# Patient Record
Sex: FEMALE | Race: WHITE | Employment: UNEMPLOYED | URBAN - METROPOLITAN AREA
[De-identification: names, ages, dates, MRNs, and addresses within clinical notes are randomized per-mention and may not be internally consistent; named-entity substitution may affect disease eponyms.]

---

## 2017-01-16 ENCOUNTER — HOSPITAL ENCOUNTER (OUTPATIENT)
Dept: OTHER | Age: 72
Discharge: HOME | End: 2017-01-16
Attending: INTERNAL MEDICINE | Admitting: INTERNAL MEDICINE

## 2017-01-16 DIAGNOSIS — L40.52 PSORIATIC ARTHRITIS MUTILANS (HCC): ICD-10-CM

## 2017-01-16 LAB
ALBUMIN SERPL-MCNC: 3.5 G/DL (ref 3.4–5)
ALP BLD-CCNC: 85 U/L (ref 40–129)
ALT SERPL-CCNC: 12 U/L (ref 10–40)
AST SERPL-CCNC: 14 U/L (ref 15–37)
BASOPHILS ABSOLUTE: 0.1 K/UL (ref 0–0.2)
BASOPHILS RELATIVE PERCENT: 1.2 %
BILIRUB SERPL-MCNC: 0.4 MG/DL (ref 0–1)
BILIRUBIN DIRECT: <0.2 MG/DL (ref 0–0.3)
BILIRUBIN, INDIRECT: ABNORMAL MG/DL (ref 0–1)
EOSINOPHILS ABSOLUTE: 0.2 K/UL (ref 0–0.6)
EOSINOPHILS RELATIVE PERCENT: 2.8 %
HCT VFR BLD CALC: 38.4 % (ref 36–48)
HEMOGLOBIN: 12.6 G/DL (ref 12–16)
LYMPHOCYTES ABSOLUTE: 1.4 K/UL (ref 1–5.1)
LYMPHOCYTES RELATIVE PERCENT: 17.2 %
MCH RBC QN AUTO: 31 PG (ref 26–34)
MCHC RBC AUTO-ENTMCNC: 32.9 G/DL (ref 31–36)
MCV RBC AUTO: 94.2 FL (ref 80–100)
MONOCYTES ABSOLUTE: 0.5 K/UL (ref 0–1.3)
MONOCYTES RELATIVE PERCENT: 6 %
NEUTROPHILS ABSOLUTE: 6.1 K/UL (ref 1.7–7.7)
NEUTROPHILS RELATIVE PERCENT: 72.8 %
PDW BLD-RTO: 13.5 % (ref 12.4–15.4)
PLATELET # BLD: 247 K/UL (ref 135–450)
PMV BLD AUTO: 7.8 FL (ref 5–10.5)
RBC # BLD: 4.07 M/UL (ref 4–5.2)
TOTAL PROTEIN: 7.2 G/DL (ref 6.4–8.2)
WBC # BLD: 8.4 K/UL (ref 4–11)

## 2017-03-13 ENCOUNTER — HOSPITAL ENCOUNTER (OUTPATIENT)
Dept: OTHER | Age: 72
Discharge: HOME | End: 2017-03-13
Attending: INTERNAL MEDICINE | Admitting: INTERNAL MEDICINE

## 2017-03-13 LAB
ALBUMIN SERPL-MCNC: 4.2 G/DL (ref 3.4–5)
ALP BLD-CCNC: 81 U/L (ref 40–129)
ALT SERPL-CCNC: 12 U/L (ref 10–40)
AST SERPL-CCNC: 12 U/L (ref 15–37)
BILIRUB SERPL-MCNC: 0.5 MG/DL (ref 0–1)
BILIRUBIN DIRECT: <0.2 MG/DL (ref 0–0.3)
BILIRUBIN, INDIRECT: ABNORMAL MG/DL (ref 0–1)
HCT VFR BLD CALC: 42.7 % (ref 36–48)
HEMOGLOBIN: 13.9 G/DL (ref 12–16)
MCH RBC QN AUTO: 31.3 PG (ref 26–34)
MCHC RBC AUTO-ENTMCNC: 32.6 G/DL (ref 31–36)
MCV RBC AUTO: 96.1 FL (ref 80–100)
PDW BLD-RTO: 15.1 % (ref 12.4–15.4)
PLATELET # BLD: 215 K/UL (ref 135–450)
PMV BLD AUTO: 7.9 FL (ref 5–10.5)
RBC # BLD: 4.45 M/UL (ref 4–5.2)
TOTAL PROTEIN: 7.7 G/DL (ref 6.4–8.2)
VITAMIN D 25-HYDROXY: 14 NG/ML
WBC # BLD: 9.4 K/UL (ref 4–11)

## 2017-05-11 ENCOUNTER — HOSPITAL ENCOUNTER (OUTPATIENT)
Dept: OTHER | Age: 72
Discharge: HOME | End: 2017-05-11
Attending: INTERNAL MEDICINE | Admitting: INTERNAL MEDICINE

## 2017-05-11 LAB
ALBUMIN SERPL-MCNC: 4.2 G/DL (ref 3.4–5)
ALP BLD-CCNC: 73 U/L (ref 40–129)
ALT SERPL-CCNC: 12 U/L (ref 10–40)
AST SERPL-CCNC: 13 U/L (ref 15–37)
BILIRUB SERPL-MCNC: 0.5 MG/DL (ref 0–1)
BILIRUBIN DIRECT: <0.2 MG/DL (ref 0–0.3)
BILIRUBIN, INDIRECT: ABNORMAL MG/DL (ref 0–1)
HCT VFR BLD CALC: 40 % (ref 36–48)
HEMOGLOBIN: 13.2 G/DL (ref 12–16)
MCH RBC QN AUTO: 32.3 PG (ref 26–34)
MCHC RBC AUTO-ENTMCNC: 32.9 G/DL (ref 31–36)
MCV RBC AUTO: 98 FL (ref 80–100)
PDW BLD-RTO: 14.9 % (ref 12.4–15.4)
PLATELET # BLD: 226 K/UL (ref 135–450)
PMV BLD AUTO: 7.8 FL (ref 5–10.5)
RBC # BLD: 4.08 M/UL (ref 4–5.2)
TOTAL PROTEIN: 7.3 G/DL (ref 6.4–8.2)
WBC # BLD: 6 K/UL (ref 4–11)

## 2017-09-11 ENCOUNTER — HOSPITAL ENCOUNTER (OUTPATIENT)
Dept: OTHER | Age: 72
Discharge: HOME | End: 2017-09-11
Attending: INTERNAL MEDICINE | Admitting: INTERNAL MEDICINE

## 2017-09-11 LAB
ALBUMIN SERPL-MCNC: 4.2 G/DL (ref 3.4–5)
ALP BLD-CCNC: 84 U/L (ref 40–129)
ALT SERPL-CCNC: 11 U/L (ref 10–40)
AST SERPL-CCNC: 12 U/L (ref 15–37)
BASOPHILS ABSOLUTE: 0.1 K/UL (ref 0–0.2)
BASOPHILS RELATIVE PERCENT: 0.7 %
BILIRUB SERPL-MCNC: 0.4 MG/DL (ref 0–1)
BILIRUBIN DIRECT: <0.2 MG/DL (ref 0–0.3)
BILIRUBIN, INDIRECT: ABNORMAL MG/DL (ref 0–1)
EOSINOPHILS ABSOLUTE: 0.2 K/UL (ref 0–0.6)
EOSINOPHILS RELATIVE PERCENT: 2.8 %
HCT VFR BLD CALC: 41.2 % (ref 36–48)
HEMOGLOBIN: 13.8 G/DL (ref 12–16)
LYMPHOCYTES ABSOLUTE: 1.3 K/UL (ref 1–5.1)
LYMPHOCYTES RELATIVE PERCENT: 16.9 %
MCH RBC QN AUTO: 32.8 PG (ref 26–34)
MCHC RBC AUTO-ENTMCNC: 33.5 G/DL (ref 31–36)
MCV RBC AUTO: 98.1 FL (ref 80–100)
MONOCYTES ABSOLUTE: 0.5 K/UL (ref 0–1.3)
MONOCYTES RELATIVE PERCENT: 6.7 %
NEUTROPHILS ABSOLUTE: 5.6 K/UL (ref 1.7–7.7)
NEUTROPHILS RELATIVE PERCENT: 72.9 %
PDW BLD-RTO: 14.4 % (ref 12.4–15.4)
PLATELET # BLD: 219 K/UL (ref 135–450)
PMV BLD AUTO: 7.8 FL (ref 5–10.5)
RBC # BLD: 4.2 M/UL (ref 4–5.2)
TOTAL PROTEIN: 7.9 G/DL (ref 6.4–8.2)
WBC # BLD: 7.7 K/UL (ref 4–11)

## 2017-12-15 ENCOUNTER — HOSPITAL ENCOUNTER (OUTPATIENT)
Dept: OTHER | Age: 72
Discharge: HOME | End: 2017-12-15
Attending: INTERNAL MEDICINE | Admitting: INTERNAL MEDICINE

## 2017-12-15 LAB
ALBUMIN SERPL-MCNC: 4.3 G/DL (ref 3.4–5)
ALP BLD-CCNC: 92 U/L (ref 40–129)
ALT SERPL-CCNC: 11 U/L (ref 10–40)
AST SERPL-CCNC: 15 U/L (ref 15–37)
BASOPHILS ABSOLUTE: 0 K/UL (ref 0–0.2)
BASOPHILS RELATIVE PERCENT: 0.7 %
BILIRUB SERPL-MCNC: <0.2 MG/DL (ref 0–1)
BILIRUBIN DIRECT: <0.2 MG/DL (ref 0–0.3)
BILIRUBIN, INDIRECT: NORMAL MG/DL (ref 0–1)
EOSINOPHILS ABSOLUTE: 0.2 K/UL (ref 0–0.6)
EOSINOPHILS RELATIVE PERCENT: 2.6 %
HCT VFR BLD CALC: 39 % (ref 36–48)
HEMOGLOBIN: 13 G/DL (ref 12–16)
LYMPHOCYTES ABSOLUTE: 1.2 K/UL (ref 1–5.1)
LYMPHOCYTES RELATIVE PERCENT: 16.1 %
MCH RBC QN AUTO: 33 PG (ref 26–34)
MCHC RBC AUTO-ENTMCNC: 33.2 G/DL (ref 31–36)
MCV RBC AUTO: 99.5 FL (ref 80–100)
MONOCYTES ABSOLUTE: 0.5 K/UL (ref 0–1.3)
MONOCYTES RELATIVE PERCENT: 6.5 %
NEUTROPHILS ABSOLUTE: 5.5 K/UL (ref 1.7–7.7)
NEUTROPHILS RELATIVE PERCENT: 74.1 %
PDW BLD-RTO: 14.7 % (ref 12.4–15.4)
PLATELET # BLD: 232 K/UL (ref 135–450)
PMV BLD AUTO: 7.5 FL (ref 5–10.5)
RBC # BLD: 3.92 M/UL (ref 4–5.2)
TOTAL PROTEIN: 7.7 G/DL (ref 6.4–8.2)
WBC # BLD: 7.4 K/UL (ref 4–11)

## 2018-07-03 ENCOUNTER — HOSPITAL ENCOUNTER (OUTPATIENT)
Dept: VASCULAR LAB | Age: 73
Discharge: HOME | End: 2018-07-03
Attending: NURSE PRACTITIONER | Admitting: NURSE PRACTITIONER

## 2018-07-03 ENCOUNTER — HOSPITAL ENCOUNTER (OUTPATIENT)
Dept: ULTRASOUND IMAGING | Age: 73
Discharge: HOME | End: 2018-07-03
Attending: INTERNAL MEDICINE | Admitting: INTERNAL MEDICINE

## 2018-07-03 DIAGNOSIS — E04.8: ICD-10-CM

## 2018-07-03 DIAGNOSIS — L40.52 PSORIATIC ARTHRITIS, DESTRUCTIVE TYPE (HCC): ICD-10-CM

## 2018-07-03 LAB
ALBUMIN SERPL-MCNC: 3.9 G/DL (ref 3.4–5)
ALP BLD-CCNC: 70 U/L (ref 40–129)
ALT SERPL-CCNC: 12 U/L (ref 10–40)
AST SERPL-CCNC: 14 U/L (ref 15–37)
BASOPHILS ABSOLUTE: 0.1 K/UL (ref 0–0.2)
BASOPHILS RELATIVE PERCENT: 1.8 %
BILIRUB SERPL-MCNC: 0.4 MG/DL (ref 0–1)
BILIRUBIN DIRECT: <0.2 MG/DL (ref 0–0.3)
BILIRUBIN, INDIRECT: ABNORMAL MG/DL (ref 0–1)
EOSINOPHILS ABSOLUTE: 0.3 K/UL (ref 0–0.6)
EOSINOPHILS RELATIVE PERCENT: 5.5 %
HCT VFR BLD CALC: 39.2 % (ref 36–48)
HEMOGLOBIN: 13.1 G/DL (ref 12–16)
LYMPHOCYTES ABSOLUTE: 1.1 K/UL (ref 1–5.1)
LYMPHOCYTES RELATIVE PERCENT: 21.7 %
MCH RBC QN AUTO: 31.2 PG (ref 26–34)
MCHC RBC AUTO-ENTMCNC: 33.4 G/DL (ref 31–36)
MCV RBC AUTO: 93.3 FL (ref 80–100)
MONOCYTES ABSOLUTE: 0.4 K/UL (ref 0–1.3)
MONOCYTES RELATIVE PERCENT: 8.4 %
NEUTROPHILS ABSOLUTE: 3.2 K/UL (ref 1.7–7.7)
NEUTROPHILS RELATIVE PERCENT: 62.6 %
PDW BLD-RTO: 15.7 % (ref 12.4–15.4)
PLATELET # BLD: 219 K/UL (ref 135–450)
PMV BLD AUTO: 7.7 FL (ref 5–10.5)
RBC # BLD: 4.2 M/UL (ref 4–5.2)
TOTAL PROTEIN: 7.4 G/DL (ref 6.4–8.2)
WBC # BLD: 5 K/UL (ref 4–11)

## 2018-09-11 ENCOUNTER — OFFICE VISIT (OUTPATIENT)
Dept: ENDOCRINOLOGY | Age: 73
End: 2018-09-11

## 2018-09-11 VITALS
OXYGEN SATURATION: 91 % | BODY MASS INDEX: 33.9 KG/M2 | RESPIRATION RATE: 16 BRPM | TEMPERATURE: 97.4 F | SYSTOLIC BLOOD PRESSURE: 134 MMHG | DIASTOLIC BLOOD PRESSURE: 62 MMHG | HEIGHT: 62 IN | HEART RATE: 81 BPM | WEIGHT: 184.2 LBS

## 2018-09-11 DIAGNOSIS — E04.1 LEFT THYROID NODULE: ICD-10-CM

## 2018-09-11 DIAGNOSIS — E04.2 MULTIPLE THYROID NODULES: Primary | ICD-10-CM

## 2018-09-11 PROCEDURE — G8400 PT W/DXA NO RESULTS DOC: HCPCS | Performed by: INTERNAL MEDICINE

## 2018-09-11 PROCEDURE — G8417 CALC BMI ABV UP PARAM F/U: HCPCS | Performed by: INTERNAL MEDICINE

## 2018-09-11 PROCEDURE — 1090F PRES/ABSN URINE INCON ASSESS: CPT | Performed by: INTERNAL MEDICINE

## 2018-09-11 PROCEDURE — 1123F ACP DISCUSS/DSCN MKR DOCD: CPT | Performed by: INTERNAL MEDICINE

## 2018-09-11 PROCEDURE — 1101F PT FALLS ASSESS-DOCD LE1/YR: CPT | Performed by: INTERNAL MEDICINE

## 2018-09-11 PROCEDURE — 4040F PNEUMOC VAC/ADMIN/RCVD: CPT | Performed by: INTERNAL MEDICINE

## 2018-09-11 PROCEDURE — 99204 OFFICE O/P NEW MOD 45 MIN: CPT | Performed by: INTERNAL MEDICINE

## 2018-09-11 PROCEDURE — G8427 DOCREV CUR MEDS BY ELIG CLIN: HCPCS | Performed by: INTERNAL MEDICINE

## 2018-09-11 PROCEDURE — 1036F TOBACCO NON-USER: CPT | Performed by: INTERNAL MEDICINE

## 2018-09-11 PROCEDURE — 10022 PR FINE NEEDLE ASP;W/IMAGING GUIDANCE: CPT | Performed by: INTERNAL MEDICINE

## 2018-09-11 PROCEDURE — 3017F COLORECTAL CA SCREEN DOC REV: CPT | Performed by: INTERNAL MEDICINE

## 2018-09-11 ASSESSMENT — ENCOUNTER SYMPTOMS
PHOTOPHOBIA: 0
BACK PAIN: 0
HEMOPTYSIS: 0
ORTHOPNEA: 0
BLURRED VISION: 0
DOUBLE VISION: 0
COUGH: 0

## 2018-09-11 NOTE — PROGRESS NOTES
Endocrinology       New Patient Consultation  Alba Ni M.D. Phone: [phone]   FAX: [phone]       Boaz Blake   YOB: 1945    Date of Visit:  9/11/2018    No Known Allergies  Outpatient Prescriptions Marked as Taking for the 9/11/18 encounter (Office Visit) with Chantel Benitez MD   Medication Sig Dispense Refill    Cholecalciferol (VITAMIN D) 2000 UNITS CAPS capsule Take by mouth Take once a month      albuterol sulfate HFA (PROVENTIL HFA) 108 (90 BASE) MCG/ACT inhaler Inhale 2 puffs into the lungs every 6 hours as needed for Wheezing 1 Inhaler 3    diazepam (VALIUM) 5 MG tablet Take 1 tablet by mouth every 8 hours as needed for Anxiety. 30 tablet 0    methotrexate (RHEUMATREX) 2.5 MG chemo tablet Take 5 mg by mouth once a week Take 5 tablets weekly      folic acid (FOLVITE) 1 MG tablet Take 1 mg by mouth daily.  sertraline (ZOLOFT) 100 MG tablet Take 100 mg by mouth daily.  Hydrocodone-Ibuprofen (VICOPROFEN PO) Take 7.5 mg by mouth every 6 hours as needed.  lovastatin (MEVACOR) 40 MG tablet Take 40 mg by mouth nightly. Vitals:    09/11/18 1352   BP: 134/62   Site: Right Upper Arm   Position: Sitting   Cuff Size: Medium Adult   Pulse: 81   Resp: 16   Temp: 97.4 °F (36.3 °C)   TempSrc: Oral   SpO2: 91%   Weight: 184 lb 3.2 oz (83.6 kg)   Height: 5' 1.81\" (1.57 m)     Body mass index is 33.9 kg/m². Wt Readings from Last 3 Encounters:   09/11/18 184 lb 3.2 oz (83.6 kg)   04/14/16 194 lb (88 kg)   03/26/15 193 lb 8 oz (87.8 kg)     BP Readings from Last 3 Encounters:   09/11/18 134/62   04/14/16 129/68   03/29/15 104/50        Past Medical History:   Diagnosis Date    Arthritis     Psoriatic arthritis ([address]     TIA (transient ischemic attack)      Past Surgical History:   Procedure Laterality Date    BACK SURGERY      FOOT SURGERY       History reviewed. No pertinent family history.   History   Smoking Status    Former Smoker   

## 2018-09-24 ENCOUNTER — TELEPHONE (OUTPATIENT)
Dept: ENDOCRINOLOGY | Age: 73
End: 2018-09-24

## 2018-09-24 NOTE — TELEPHONE ENCOUNTER
Called patient to discuss results of her FNA biopsy of isthmus and left lobe thyroid nodule done on 09/11/18  FNA cytology of the isthmus  showed features consistent with benign hyperplastic nodule. Cytology of left thyroid nodule showed atypica of undetermined significance. Stan Camarillo is pending. Will call her when final results are available.

## 2018-10-17 ENCOUNTER — OFFICE VISIT (OUTPATIENT)
Dept: ENDOCRINOLOGY | Age: 73
End: 2018-10-17
Payer: MEDICARE

## 2018-10-17 VITALS
WEIGHT: 185.6 LBS | SYSTOLIC BLOOD PRESSURE: 98 MMHG | DIASTOLIC BLOOD PRESSURE: 67 MMHG | HEART RATE: 75 BPM | OXYGEN SATURATION: 91 % | RESPIRATION RATE: 14 BRPM | HEIGHT: 62 IN | BODY MASS INDEX: 34.16 KG/M2

## 2018-10-17 DIAGNOSIS — E04.2 MULTIPLE THYROID NODULES: Primary | ICD-10-CM

## 2018-10-17 PROCEDURE — 4040F PNEUMOC VAC/ADMIN/RCVD: CPT | Performed by: INTERNAL MEDICINE

## 2018-10-17 PROCEDURE — 1123F ACP DISCUSS/DSCN MKR DOCD: CPT | Performed by: INTERNAL MEDICINE

## 2018-10-17 PROCEDURE — G8400 PT W/DXA NO RESULTS DOC: HCPCS | Performed by: INTERNAL MEDICINE

## 2018-10-17 PROCEDURE — 99213 OFFICE O/P EST LOW 20 MIN: CPT | Performed by: INTERNAL MEDICINE

## 2018-10-17 PROCEDURE — G8484 FLU IMMUNIZE NO ADMIN: HCPCS | Performed by: INTERNAL MEDICINE

## 2018-10-17 PROCEDURE — G8417 CALC BMI ABV UP PARAM F/U: HCPCS | Performed by: INTERNAL MEDICINE

## 2018-10-17 PROCEDURE — 1090F PRES/ABSN URINE INCON ASSESS: CPT | Performed by: INTERNAL MEDICINE

## 2018-10-17 PROCEDURE — G8427 DOCREV CUR MEDS BY ELIG CLIN: HCPCS | Performed by: INTERNAL MEDICINE

## 2018-10-17 PROCEDURE — 3017F COLORECTAL CA SCREEN DOC REV: CPT | Performed by: INTERNAL MEDICINE

## 2018-10-17 PROCEDURE — 1036F TOBACCO NON-USER: CPT | Performed by: INTERNAL MEDICINE

## 2018-10-17 PROCEDURE — 1101F PT FALLS ASSESS-DOCD LE1/YR: CPT | Performed by: INTERNAL MEDICINE

## 2018-10-17 ASSESSMENT — ENCOUNTER SYMPTOMS
BACK PAIN: 0
HEMOPTYSIS: 0
ORTHOPNEA: 0
PHOTOPHOBIA: 0
COUGH: 0
DOUBLE VISION: 0
BLURRED VISION: 0

## 2018-10-17 NOTE — PROGRESS NOTES
Endocrinology  Alfredo Cheatham M.D. Phone: [phone]   FAX: [phone]       Rubio Echavarria   YOB: 1945    Date of Visit:  10/17/2018    No Known Allergies  Outpatient Prescriptions Marked as Taking for the 10/17/18 encounter (Office Visit) with Cristian Vega MD   Medication Sig Dispense Refill    Cholecalciferol (VITAMIN D) 2000 UNITS CAPS capsule Take by mouth Take once a month      diazepam (VALIUM) 5 MG tablet Take 1 tablet by mouth every 8 hours as needed for Anxiety. 30 tablet 0    methotrexate (RHEUMATREX) 2.5 MG chemo tablet Take 5 mg by mouth once a week Take 5 tablets weekly      folic acid (FOLVITE) 1 MG tablet Take 1 mg by mouth daily.  sertraline (ZOLOFT) 100 MG tablet Take 100 mg by mouth daily.  Hydrocodone-Ibuprofen (VICOPROFEN PO) Take 7.5 mg by mouth every 6 hours as needed.  lovastatin (MEVACOR) 40 MG tablet Take 40 mg by mouth nightly. Vitals:    10/17/18 1110   BP: 98/67   Site: Left Upper Arm   Position: Sitting   Cuff Size: Medium Adult   Pulse: 75   Resp: 14   SpO2: 91%   Weight: 185 lb 9.6 oz (84.2 kg)   Height: 5' 1.81\" (1.57 m)     Body mass index is 34.16 kg/m². Wt Readings from Last 3 Encounters:   10/17/18 185 lb 9.6 oz (84.2 kg)   09/11/18 184 lb 3.2 oz (83.6 kg)   04/14/16 194 lb (88 kg)     BP Readings from Last 3 Encounters:   10/17/18 98/67   09/11/18 134/62   04/14/16 129/68        Past Medical History:   Diagnosis Date    Arthritis     Psoriatic arthritis ([address]     TIA (transient ischemic attack)      Past Surgical History:   Procedure Laterality Date    BACK SURGERY      FOOT SURGERY       History reviewed. No pertinent family history.   History   Smoking Status    Former Smoker    Packs/day: 2.00    Years: 30.00    Quit date: 1/1/1997   Smokeless Tobacco    Never Used      History   Alcohol Use No       HPI    Rubio Echavarria is a 68 y.o. female who is here for a follow-up of thyroid

## 2020-08-27 ENCOUNTER — HOSPITAL ENCOUNTER (OUTPATIENT)
Age: 75
Discharge: HOME | End: 2020-08-27
Payer: MEDICARE

## 2020-08-27 ENCOUNTER — HOSPITAL ENCOUNTER (OUTPATIENT)
Dept: VASCULAR LAB | Age: 75
Discharge: HOME | End: 2020-08-27
Payer: MEDICARE

## 2020-08-27 PROCEDURE — 85025 COMPLETE CBC W/AUTO DIFF WBC: CPT

## 2020-08-27 PROCEDURE — 87086 URINE CULTURE/COLONY COUNT: CPT

## 2020-08-27 PROCEDURE — 84439 ASSAY OF FREE THYROXINE: CPT

## 2020-08-27 PROCEDURE — 82607 VITAMIN B-12: CPT

## 2020-08-27 PROCEDURE — 80053 COMPREHEN METABOLIC PANEL: CPT

## 2020-08-27 PROCEDURE — 83036 HEMOGLOBIN GLYCOSYLATED A1C: CPT

## 2020-08-27 PROCEDURE — 93971 EXTREMITY STUDY: CPT

## 2020-08-27 PROCEDURE — 84443 ASSAY THYROID STIM HORMONE: CPT

## 2020-08-27 PROCEDURE — 36415 COLL VENOUS BLD VENIPUNCTURE: CPT

## 2020-08-28 LAB
A/G RATIO: 1.2 (ref 1.1–2.2)
ALBUMIN SERPL-MCNC: 4.1 G/DL (ref 3.4–5)
ALP BLD-CCNC: 80 U/L (ref 40–129)
ALT SERPL-CCNC: 11 U/L (ref 10–40)
ANION GAP SERPL CALCULATED.3IONS-SCNC: 11 MMOL/L (ref 3–16)
AST SERPL-CCNC: 14 U/L (ref 15–37)
BASOPHILS ABSOLUTE: 0 K/UL (ref 0–0.2)
BASOPHILS RELATIVE PERCENT: 0.7 %
BILIRUB SERPL-MCNC: 0.5 MG/DL (ref 0–1)
BUN BLDV-MCNC: 11 MG/DL (ref 7–20)
CALCIUM SERPL-MCNC: 10.3 MG/DL (ref 8.3–10.6)
CHLORIDE BLD-SCNC: 100 MMOL/L (ref 99–110)
CO2: 28 MMOL/L (ref 21–32)
CREAT SERPL-MCNC: 0.7 MG/DL (ref 0.6–1.2)
EOSINOPHILS ABSOLUTE: 0.2 K/UL (ref 0–0.6)
EOSINOPHILS RELATIVE PERCENT: 4.2 %
ESTIMATED AVERAGE GLUCOSE: 125.5 MG/DL
GFR AFRICAN AMERICAN: >60
GFR NON-AFRICAN AMERICAN: >60
GLOBULIN: 3.3 G/DL
GLUCOSE BLD-MCNC: 94 MG/DL (ref 70–99)
HBA1C MFR BLD: 6 %
HCT VFR BLD CALC: 39 % (ref 36–48)
HEMOGLOBIN: 13 G/DL (ref 12–16)
LYMPHOCYTES ABSOLUTE: 1.4 K/UL (ref 1–5.1)
LYMPHOCYTES RELATIVE PERCENT: 30 %
MCH RBC QN AUTO: 32.4 PG (ref 26–34)
MCHC RBC AUTO-ENTMCNC: 33.3 G/DL (ref 31–36)
MCV RBC AUTO: 97.1 FL (ref 80–100)
MONOCYTES ABSOLUTE: 0.4 K/UL (ref 0–1.3)
MONOCYTES RELATIVE PERCENT: 8.2 %
NEUTROPHILS ABSOLUTE: 2.7 K/UL (ref 1.7–7.7)
NEUTROPHILS RELATIVE PERCENT: 56.9 %
PDW BLD-RTO: 14.6 % (ref 12.4–15.4)
PLATELET # BLD: 183 K/UL (ref 135–450)
PMV BLD AUTO: 8.1 FL (ref 5–10.5)
POTASSIUM SERPL-SCNC: 4.2 MMOL/L (ref 3.5–5.1)
RBC # BLD: 4.01 M/UL (ref 4–5.2)
SODIUM BLD-SCNC: 139 MMOL/L (ref 136–145)
T4 FREE: 1.4 NG/DL (ref 0.9–1.8)
TOTAL PROTEIN: 7.4 G/DL (ref 6.4–8.2)
TSH SERPL DL<=0.05 MIU/L-ACNC: 0.56 UIU/ML (ref 0.27–4.2)
URINE CULTURE, ROUTINE: NORMAL
VITAMIN B-12: 285 PG/ML (ref 211–911)
WBC # BLD: 4.8 K/UL (ref 4–11)

## 2024-07-09 ENCOUNTER — APPOINTMENT (OUTPATIENT)
Dept: GENERAL RADIOLOGY | Age: 79
End: 2024-07-09
Payer: MEDICARE

## 2024-07-09 ENCOUNTER — APPOINTMENT (OUTPATIENT)
Dept: CT IMAGING | Age: 79
End: 2024-07-09
Payer: MEDICARE

## 2024-07-09 ENCOUNTER — HOSPITAL ENCOUNTER (OUTPATIENT)
Age: 79
Setting detail: OBSERVATION
Discharge: NURSING, REHAB OR LONG-TERM CARE | End: 2024-07-15
Attending: EMERGENCY MEDICINE | Admitting: STUDENT IN AN ORGANIZED HEALTH CARE EDUCATION/TRAINING PROGRAM
Payer: MEDICARE

## 2024-07-09 DIAGNOSIS — N30.00 ACUTE CYSTITIS WITHOUT HEMATURIA: ICD-10-CM

## 2024-07-09 DIAGNOSIS — R26.2 UNABLE TO AMBULATE: ICD-10-CM
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
## 2024-07-09 PROBLEM — R53.81 PHYSICAL DECONDITIONING: Status: ACTIVE | Noted: 2024-07-09

## 2024-07-09 LAB
ALBUMIN SERPL-MCNC: 3.6 G/DL (ref 3.4–5)
ALBUMIN/GLOB SERPL: 1.2 {RATIO} (ref 1.1–2.2)
ALP SERPL-CCNC: 75 U/L (ref 40–129)
ALT SERPL-CCNC: 8 U/L (ref 10–40)
ANION GAP SERPL CALCULATED.3IONS-SCNC: 9 MMOL/L (ref 3–16)
AST SERPL-CCNC: 15 U/L (ref 15–37)
BACTERIA URNS QL MICRO: ABNORMAL /HPF
BASOPHILS # BLD: 0 K/UL (ref 0–0.2)
BASOPHILS NFR BLD: 0.2 %
BILIRUB SERPL-MCNC: 0.6 MG/DL (ref 0–1)
BILIRUB UR QL STRIP.AUTO: ABNORMAL
BUN SERPL-MCNC: 12 MG/DL (ref 7–20)
CALCIUM SERPL-MCNC: 9.5 MG/DL (ref 8.3–10.6)
CHLORIDE SERPL-SCNC: 94 MMOL/L (ref 99–110)
CLARITY UR: ABNORMAL
CO2 SERPL-SCNC: 28 MMOL/L (ref 21–32)
COLOR UR: YELLOW
CREAT SERPL-MCNC: <0.5 MG/DL (ref 0.6–1.2)
DEPRECATED RDW RBC AUTO: 14.6 % (ref 12.4–15.4)
EOSINOPHIL # BLD: 0.1 K/UL (ref 0–0.6)
EOSINOPHIL NFR BLD: 1.2 %
EPI CELLS #/AREA URNS HPF: ABNORMAL /HPF (ref 0–5)
GFR SERPLBLD CREATININE-BSD FMLA CKD-EPI: >90 ML/MIN/{1.73_M2}
GLUCOSE SERPL-MCNC: 114 MG/DL (ref 70–99)
GLUCOSE UR STRIP.AUTO-MCNC: NEGATIVE MG/DL
HCT VFR BLD AUTO: 34.5 % (ref 36–48)
HGB BLD-MCNC: 11.4 G/DL (ref 12–16)
HGB UR QL STRIP.AUTO: NEGATIVE
KETONES UR STRIP.AUTO-MCNC: NEGATIVE MG/DL
LEUKOCYTE ESTERASE UR QL STRIP.AUTO: ABNORMAL
LYMPHOCYTES # BLD: 0.5 K/UL (ref 1–5.1)
LYMPHOCYTES NFR BLD: 5.5 %
MCH RBC QN AUTO: 32.1 PG (ref 26–34)
MCHC RBC AUTO-ENTMCNC: 33.2 G/DL (ref 31–36)
MCV RBC AUTO: 96.7 FL (ref 80–100)
MONOCYTES # BLD: 0.5 K/UL (ref 0–1.3)
MONOCYTES NFR BLD: 5.7 %
NEUTROPHILS # BLD: 7.9 K/UL (ref 1.7–7.7)
NEUTROPHILS NFR BLD: 87.4 %
NITRITE UR QL STRIP.AUTO: NEGATIVE
PH UR STRIP.AUTO: 6.5 [PH] (ref 5–8)
PLATELET # BLD AUTO: 180 K/UL (ref 135–450)
PMV BLD AUTO: 7 FL (ref 5–10.5)
POTASSIUM SERPL-SCNC: 4.1 MMOL/L (ref 3.5–5.1)
PROT SERPL-MCNC: 6.7 G/DL (ref 6.4–8.2)
PROT UR STRIP.AUTO-MCNC: ABNORMAL MG/DL
RBC # BLD AUTO: 3.57 M/UL (ref 4–5.2)
RBC #/AREA URNS HPF: ABNORMAL /HPF (ref 0–4)
RENAL EPI CELLS #/AREA UR COMP ASSIST: ABNORMAL /HPF (ref 0–1)
SODIUM SERPL-SCNC: 131 MMOL/L (ref 136–145)
SP GR UR STRIP.AUTO: 1.02 (ref 1–1.03)
UA COMPLETE W REFLEX CULTURE PNL UR: YES
UA DIPSTICK W REFLEX MICRO PNL UR: YES
URN SPEC COLLECT METH UR: ABNORMAL
UROBILINOGEN UR STRIP-ACNC: 1 E.U./DL
WBC # BLD AUTO: 9 K/UL (ref 4–11)
WBC #/AREA URNS HPF: ABNORMAL /HPF (ref 0–5)

## 2024-07-09 PROCEDURE — 6370000000 HC RX 637 (ALT 250 FOR IP): Performed by: STUDENT IN AN ORGANIZED HEALTH CARE EDUCATION/TRAINING PROGRAM

## 2024-07-09 PROCEDURE — 72100 X-RAY EXAM L-S SPINE 2/3 VWS: CPT

## 2024-07-09 PROCEDURE — 6360000002 HC RX W HCPCS: Performed by: EMERGENCY MEDICINE

## 2024-07-09 PROCEDURE — 87077 CULTURE AEROBIC IDENTIFY: CPT

## 2024-07-09 PROCEDURE — 72125 CT NECK SPINE W/O DYE: CPT

## 2024-07-09 PROCEDURE — 81001 URINALYSIS AUTO W/SCOPE: CPT

## 2024-07-09 PROCEDURE — 36415 COLL VENOUS BLD VENIPUNCTURE: CPT

## 2024-07-09 PROCEDURE — 87086 URINE CULTURE/COLONY COUNT: CPT

## 2024-07-09 PROCEDURE — 73562 X-RAY EXAM OF KNEE 3: CPT

## 2024-07-09 PROCEDURE — 96365 THER/PROPH/DIAG IV INF INIT: CPT

## 2024-07-09 PROCEDURE — 80053 COMPREHEN METABOLIC PANEL: CPT

## 2024-07-09 PROCEDURE — 85025 COMPLETE CBC W/AUTO DIFF WBC: CPT

## 2024-07-09 PROCEDURE — G0378 HOSPITAL OBSERVATION PER HR: HCPCS

## 2024-07-09 PROCEDURE — 72131 CT LUMBAR SPINE W/O DYE: CPT

## 2024-07-09 PROCEDURE — 99285 EMERGENCY DEPT VISIT HI MDM: CPT

## 2024-07-09 PROCEDURE — 73521 X-RAY EXAM HIPS BI 2 VIEWS: CPT

## 2024-07-09 PROCEDURE — 70450 CT HEAD/BRAIN W/O DYE: CPT

## 2024-07-09 PROCEDURE — 6370000000 HC RX 637 (ALT 250 FOR IP): Performed by: REGISTERED NURSE

## 2024-07-09 PROCEDURE — 6370000000 HC RX 637 (ALT 250 FOR IP): Performed by: EMERGENCY MEDICINE

## 2024-07-09 PROCEDURE — 2580000003 HC RX 258: Performed by: EMERGENCY MEDICINE

## 2024-07-09 RX ORDER — ACETAMINOPHEN 650 MG/1
650 SUPPOSITORY RECTAL EVERY 6 HOURS PRN
Status: DISCONTINUED | OUTPATIENT
Start: 2024-07-09 | End: 2024-07-15 | Stop reason: HOSPADM

## 2024-07-09 RX ORDER — ONDANSETRON 4 MG/1
4 TABLET, ORALLY DISINTEGRATING ORAL EVERY 8 HOURS PRN
Status: DISCONTINUED | OUTPATIENT
Start: 2024-07-09 | End: 2024-07-15 | Stop reason: HOSPADM

## 2024-07-09 RX ORDER — ENOXAPARIN SODIUM 100 MG/ML
40 INJECTION SUBCUTANEOUS DAILY
Status: DISCONTINUED | OUTPATIENT
Start: 2024-07-10 | End: 2024-07-15 | Stop reason: HOSPADM

## 2024-07-09 RX ORDER — LORAZEPAM 1 MG/1
1 TABLET ORAL ONCE
Status: COMPLETED | OUTPATIENT
Start: 2024-07-09 | End: 2024-07-09

## 2024-07-09 RX ORDER — DIAZEPAM 5 MG/1
5 TABLET ORAL EVERY 8 HOURS PRN
Status: DISCONTINUED | OUTPATIENT
Start: 2024-07-09 | End: 2024-07-15 | Stop reason: HOSPADM

## 2024-07-09 RX ORDER — ALBUTEROL SULFATE 90 UG/1
2 AEROSOL, METERED RESPIRATORY (INHALATION) EVERY 6 HOURS PRN
Status: DISCONTINUED | OUTPATIENT
Start: 2024-07-09 | End: 2024-07-10

## 2024-07-09 RX ORDER — ACETAMINOPHEN 325 MG/1
650 TABLET ORAL EVERY 6 HOURS PRN
Status: DISCONTINUED | OUTPATIENT
Start: 2024-07-09 | End: 2024-07-10 | Stop reason: SDUPTHER

## 2024-07-09 RX ORDER — SODIUM CHLORIDE 9 MG/ML
INJECTION, SOLUTION INTRAVENOUS PRN
Status: DISCONTINUED | OUTPATIENT
Start: 2024-07-09 | End: 2024-07-15 | Stop reason: HOSPADM

## 2024-07-09 RX ORDER — ATORVASTATIN CALCIUM 10 MG/1
10 TABLET, FILM COATED ORAL DAILY
Status: DISCONTINUED | OUTPATIENT
Start: 2024-07-10 | End: 2024-07-15 | Stop reason: HOSPADM

## 2024-07-09 RX ORDER — SERTRALINE HYDROCHLORIDE 100 MG/1
100 TABLET, FILM COATED ORAL DAILY
Status: DISCONTINUED | OUTPATIENT
Start: 2024-07-10 | End: 2024-07-15 | Stop reason: HOSPADM

## 2024-07-09 RX ORDER — POTASSIUM CHLORIDE 7.45 MG/ML
10 INJECTION INTRAVENOUS PRN
Status: DISCONTINUED | OUTPATIENT
Start: 2024-07-09 | End: 2024-07-15 | Stop reason: HOSPADM

## 2024-07-09 RX ORDER — SODIUM CHLORIDE 0.9 % (FLUSH) 0.9 %
5-40 SYRINGE (ML) INJECTION PRN
Status: DISCONTINUED | OUTPATIENT
Start: 2024-07-09 | End: 2024-07-15 | Stop reason: HOSPADM

## 2024-07-09 RX ORDER — ONDANSETRON 2 MG/ML
4 INJECTION INTRAMUSCULAR; INTRAVENOUS EVERY 6 HOURS PRN
Status: DISCONTINUED | OUTPATIENT
Start: 2024-07-09 | End: 2024-07-15 | Stop reason: HOSPADM

## 2024-07-09 RX ORDER — HYDROCODONE BITARTRATE AND ACETAMINOPHEN 5; 325 MG/1; MG/1
1 TABLET ORAL ONCE
Status: COMPLETED | OUTPATIENT
Start: 2024-07-09 | End: 2024-07-09

## 2024-07-09 RX ORDER — SODIUM CHLORIDE 0.9 % (FLUSH) 0.9 %
5-40 SYRINGE (ML) INJECTION EVERY 12 HOURS SCHEDULED
Status: DISCONTINUED | OUTPATIENT
Start: 2024-07-09 | End: 2024-07-15 | Stop reason: HOSPADM

## 2024-07-09 RX ORDER — MAGNESIUM SULFATE IN WATER 40 MG/ML
2000 INJECTION, SOLUTION INTRAVENOUS PRN
Status: DISCONTINUED | OUTPATIENT
Start: 2024-07-09 | End: 2024-07-15 | Stop reason: HOSPADM

## 2024-07-09 RX ORDER — POLYETHYLENE GLYCOL 3350 17 G/17G
17 POWDER, FOR SOLUTION ORAL DAILY PRN
Status: DISCONTINUED | OUTPATIENT
Start: 2024-07-09 | End: 2024-07-15 | Stop reason: HOSPADM

## 2024-07-09 RX ORDER — POTASSIUM CHLORIDE 20 MEQ/1
40 TABLET, EXTENDED RELEASE ORAL PRN
Status: DISCONTINUED | OUTPATIENT
Start: 2024-07-09 | End: 2024-07-15 | Stop reason: HOSPADM

## 2024-07-09 RX ADMIN — LORAZEPAM 1 MG: 1 TABLET ORAL at 17:41

## 2024-07-09 RX ADMIN — HYDROCODONE BITARTRATE AND ACETAMINOPHEN 1 TABLET: 5; 325 TABLET ORAL at 16:35

## 2024-07-09 RX ADMIN — CEFTRIAXONE SODIUM 1000 MG: 1 INJECTION, POWDER, FOR SOLUTION INTRAMUSCULAR; INTRAVENOUS at 19:40

## 2024-07-09 ASSESSMENT — PAIN DESCRIPTION - LOCATION
LOCATION: KNEE
LOCATION: KNEE

## 2024-07-09 ASSESSMENT — PAIN DESCRIPTION - DESCRIPTORS: DESCRIPTORS: ACHING

## 2024-07-09 ASSESSMENT — PAIN DESCRIPTION - PAIN TYPE: TYPE: ACUTE PAIN

## 2024-07-09 ASSESSMENT — PAIN DESCRIPTION - ORIENTATION
ORIENTATION: RIGHT
ORIENTATION: RIGHT

## 2024-07-09 ASSESSMENT — PAIN - FUNCTIONAL ASSESSMENT: PAIN_FUNCTIONAL_ASSESSMENT: 0-10

## 2024-07-09 ASSESSMENT — PAIN SCALES - GENERAL
PAINLEVEL_OUTOF10: 10
PAINLEVEL_OUTOF10: 10

## 2024-07-09 NOTE — ED PROVIDER NOTES
[address]-SURGICAL  EMERGENCY DEPARTMENT ENCOUNTER        Pt Name: Nya Sanchez  MRN: [id number]  Birthdate 1945  Date of evaluation: 7/9/2024  Provider: ELISHA Gonzales CNP  PCP: Iman Singh APRN - CNP    This patient was seen and evaluated by the attending physician Dr. Fisher.    I have evaluated this patient. My supervising physician was available for consultation.      CHIEF COMPLAINT       Chief Complaint   Patient presents with    Fall     Pt arrives via EMS from home. Pt fell yesterday at home, pt endorses she did hit her head, no LOC, no thinners. Pt c/o R knee pain with swelling.        HISTORY OF PRESENT ILLNESS   (Location/Symptom, Timing/Onset, Context/Setting, Quality, Duration, Modifying Factors, Severity)  Note limiting factors.     History from : Patient  Nya Sanchez is a 79 y.o. female who presents via EMS for mechanical fall. Onset was yesterday. Duration has been since the onset. Context includes patient presents to the emergency department today after sustaining a mechanical fall yesterday.  She tripped over a rubber door stopper on the floor and fell backwards landing on her butt and backwards striking her head.  She presents today complaining of right knee pain with swelling that is began to spread to her mid calf down to her ankle.  She denies any ankle pain.  She is having difficulty bearing weight secondary to pain.  She denies dizziness or lightheadedness associated with the fall.  She does not take blood thinners.  She denies dizziness or lightheadedness today.  She denies any pain to her left extremity.  She denies pain to her hip or thigh.. Quality is dull and aching with radiation to her right knee. Alleviating factors include immobilization. Aggravating factors include weightbearing, movement, palpation. Pain is 10/10.  Norco has been used for pain today.       Chart review reveals pt has significant PMHx of anxiety, scoliosis,

## 2024-07-09 NOTE — ED PROVIDER NOTES
I independently examined and evaluated Nya Sanchez.    In brief, patient is a 79yoF who presents to the ED for evaluation after fall yesterday. Says she fell over a rubber stopper and fell onto her buttock. Says she must have twisted her right knee because she has not been able to bear weight since then. Reports noticing a \"goose egg\" on the back of the head but improved after she applied ice yesterday. No headache today. No loc. Not on blood thinners. She was noted to have significant scoliosis. No c/t/l stepoffs. Tenderness over the lumbar spine. Effusion over the right knee. 2+ DP pulses bilaterally. Moving all toes bilaterally. Minimally cooperative with examination. I was notified that patient was refusing CT scans. She demonstrated capacity to make decisions. She verbalized understanding on why I was recommending ct scans and was able to tell me in her own words it was to rule out new injury or head bleed. She refused because she said it was too painful for her to lay flat. I offered to give her pain medication and she declined. Says she just did not think she could lay down because of her scoliosis. She also refused XR. I had multiple conversations with her nurse shantal at bedside. I gave her ativan as she appears quite anxious. After ativan, she was agreeable with XR. I reviewed the xr myself and did not see any obvious injuries. As she reports she has not been able to bear weight and family unable to care for her, recommended admission. She initially declined but after talking to family she reluctantly agreed. She was agreeable with ct head and ct cerivcal spine. Xr knee, lumbar spine, bilateral hip showed no obvious fracture. CT lumbar spine also obtained.  Urinalysis indicative of UTI.  Given ceftriaxone.  CT shows no acute intracranial abnormality.  No acute osseous abnormality of the cervical spine.  Severe lumbar scoliotic deformity partly degenerative.  L1-L2 fusion, and likely superior

## 2024-07-10 ENCOUNTER — APPOINTMENT (OUTPATIENT)
Dept: CT IMAGING | Age: 79
End: 2024-07-10
Payer: MEDICARE

## 2024-07-10 PROBLEM — R26.2 UNABLE TO AMBULATE: Status: ACTIVE | Noted: 2024-07-10

## 2024-07-10 PROBLEM — N30.00 ACUTE CYSTITIS WITHOUT HEMATURIA: Status: ACTIVE | Noted: 2024-07-10
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
## 2024-07-10 LAB
ANION GAP SERPL CALCULATED.3IONS-SCNC: 8 MMOL/L (ref 3–16)
BACTERIA UR CULT: ABNORMAL
BASOPHILS # BLD: 0 K/UL (ref 0–0.2)
BASOPHILS NFR BLD: 0.3 %
BUN SERPL-MCNC: 8 MG/DL (ref 7–20)
CALCIUM SERPL-MCNC: 9.9 MG/DL (ref 8.3–10.6)
CHLORIDE SERPL-SCNC: 96 MMOL/L (ref 99–110)
CK SERPL-CCNC: 218 U/L (ref 26–192)
CO2 SERPL-SCNC: 30 MMOL/L (ref 21–32)
CREAT SERPL-MCNC: <0.5 MG/DL (ref 0.6–1.2)
DEPRECATED RDW RBC AUTO: 14.2 % (ref 12.4–15.4)
EOSINOPHIL # BLD: 0.3 K/UL (ref 0–0.6)
EOSINOPHIL NFR BLD: 3.2 %
FOLATE SERPL-MCNC: 6.09 NG/ML (ref 4.78–24.2)
GFR SERPLBLD CREATININE-BSD FMLA CKD-EPI: >90 ML/MIN/{1.73_M2}
GLUCOSE SERPL-MCNC: 103 MG/DL (ref 70–99)
HCT VFR BLD AUTO: 35.6 % (ref 36–48)
HGB BLD-MCNC: 11.9 G/DL (ref 12–16)
IRON SATN MFR SERPL: 16 % (ref 15–50)
IRON SERPL-MCNC: 37 UG/DL (ref 37–145)
LYMPHOCYTES # BLD: 0.6 K/UL (ref 1–5.1)
LYMPHOCYTES NFR BLD: 6.6 %
MCH RBC QN AUTO: 32.1 PG (ref 26–34)
MCHC RBC AUTO-ENTMCNC: 33.3 G/DL (ref 31–36)
MCV RBC AUTO: 96.5 FL (ref 80–100)
MONOCYTES # BLD: 0.3 K/UL (ref 0–1.3)
MONOCYTES NFR BLD: 3 %
NEUTROPHILS # BLD: 7.4 K/UL (ref 1.7–7.7)
NEUTROPHILS NFR BLD: 86.9 %
ORGANISM: ABNORMAL
PLATELET # BLD AUTO: 178 K/UL (ref 135–450)
PMV BLD AUTO: 7 FL (ref 5–10.5)
POTASSIUM SERPL-SCNC: 4.2 MMOL/L (ref 3.5–5.1)
RBC # BLD AUTO: 3.69 M/UL (ref 4–5.2)
SODIUM SERPL-SCNC: 134 MMOL/L (ref 136–145)
TIBC SERPL-MCNC: 231 UG/DL (ref 260–445)
TSH SERPL DL<=0.005 MIU/L-ACNC: 0.36 UIU/ML (ref 0.27–4.2)
VIT B12 SERPL-MCNC: 276 PG/ML (ref 211–911)
WBC # BLD AUTO: 8.5 K/UL (ref 4–11)

## 2024-07-10 PROCEDURE — 2700000000 HC OXYGEN THERAPY PER DAY

## 2024-07-10 PROCEDURE — 96366 THER/PROPH/DIAG IV INF ADDON: CPT

## 2024-07-10 PROCEDURE — 84443 ASSAY THYROID STIM HORMONE: CPT

## 2024-07-10 PROCEDURE — 6370000000 HC RX 637 (ALT 250 FOR IP): Performed by: INTERNAL MEDICINE

## 2024-07-10 PROCEDURE — 82746 ASSAY OF FOLIC ACID SERUM: CPT

## 2024-07-10 PROCEDURE — 96372 THER/PROPH/DIAG INJ SC/IM: CPT

## 2024-07-10 PROCEDURE — 80048 BASIC METABOLIC PNL TOTAL CA: CPT

## 2024-07-10 PROCEDURE — G0378 HOSPITAL OBSERVATION PER HR: HCPCS

## 2024-07-10 PROCEDURE — 99232 SBSQ HOSP IP/OBS MODERATE 35: CPT

## 2024-07-10 PROCEDURE — 6370000000 HC RX 637 (ALT 250 FOR IP): Performed by: STUDENT IN AN ORGANIZED HEALTH CARE EDUCATION/TRAINING PROGRAM

## 2024-07-10 PROCEDURE — 6360000002 HC RX W HCPCS: Performed by: STUDENT IN AN ORGANIZED HEALTH CARE EDUCATION/TRAINING PROGRAM

## 2024-07-10 PROCEDURE — 82550 ASSAY OF CK (CPK): CPT

## 2024-07-10 PROCEDURE — 94761 N-INVAS EAR/PLS OXIMETRY MLT: CPT

## 2024-07-10 PROCEDURE — 83540 ASSAY OF IRON: CPT

## 2024-07-10 PROCEDURE — 36415 COLL VENOUS BLD VENIPUNCTURE: CPT

## 2024-07-10 PROCEDURE — 2580000003 HC RX 258: Performed by: STUDENT IN AN ORGANIZED HEALTH CARE EDUCATION/TRAINING PROGRAM

## 2024-07-10 PROCEDURE — 82607 VITAMIN B-12: CPT

## 2024-07-10 PROCEDURE — 83550 IRON BINDING TEST: CPT

## 2024-07-10 PROCEDURE — 6360000002 HC RX W HCPCS: Performed by: EMERGENCY MEDICINE

## 2024-07-10 PROCEDURE — 6370000000 HC RX 637 (ALT 250 FOR IP)

## 2024-07-10 PROCEDURE — 85025 COMPLETE CBC W/AUTO DIFF WBC: CPT

## 2024-07-10 PROCEDURE — 2580000003 HC RX 258: Performed by: EMERGENCY MEDICINE

## 2024-07-10 RX ORDER — ALBUTEROL SULFATE 90 UG/1
2 AEROSOL, METERED RESPIRATORY (INHALATION) EVERY 4 HOURS PRN
Status: DISCONTINUED | OUTPATIENT
Start: 2024-07-10 | End: 2024-07-15 | Stop reason: HOSPADM

## 2024-07-10 RX ORDER — ACETAMINOPHEN 325 MG/1
650 TABLET ORAL EVERY 4 HOURS PRN
Status: DISCONTINUED | OUTPATIENT
Start: 2024-07-10 | End: 2024-07-15 | Stop reason: HOSPADM

## 2024-07-10 RX ORDER — TRAMADOL HYDROCHLORIDE 50 MG/1
50 TABLET ORAL EVERY 8 HOURS PRN
Status: DISCONTINUED | OUTPATIENT
Start: 2024-07-10 | End: 2024-07-11

## 2024-07-10 RX ORDER — HYDROCODONE BITARTRATE AND ACETAMINOPHEN 7.5; 325 MG/1; MG/1
1 TABLET ORAL EVERY 6 HOURS PRN
COMMUNITY

## 2024-07-10 RX ADMIN — ACETAMINOPHEN 650 MG: 325 TABLET ORAL at 06:37

## 2024-07-10 RX ADMIN — ATORVASTATIN CALCIUM 10 MG: 10 TABLET, FILM COATED ORAL at 10:26

## 2024-07-10 RX ADMIN — SODIUM CHLORIDE, PRESERVATIVE FREE 10 ML: 5 INJECTION INTRAVENOUS at 20:59

## 2024-07-10 RX ADMIN — CEFTRIAXONE SODIUM 1000 MG: 1 INJECTION, POWDER, FOR SOLUTION INTRAMUSCULAR; INTRAVENOUS at 20:58

## 2024-07-10 RX ADMIN — SODIUM CHLORIDE, PRESERVATIVE FREE 10 ML: 5 INJECTION INTRAVENOUS at 10:29

## 2024-07-10 RX ADMIN — TRAMADOL HYDROCHLORIDE 50 MG: 50 TABLET, COATED ORAL at 20:58

## 2024-07-10 RX ADMIN — ENOXAPARIN SODIUM 40 MG: 100 INJECTION SUBCUTANEOUS at 10:26

## 2024-07-10 RX ADMIN — SERTRALINE 50 MG: 100 TABLET, FILM COATED ORAL at 10:26

## 2024-07-10 ASSESSMENT — PAIN - FUNCTIONAL ASSESSMENT: PAIN_FUNCTIONAL_ASSESSMENT: ACTIVITIES ARE NOT PREVENTED

## 2024-07-10 ASSESSMENT — PAIN DESCRIPTION - DESCRIPTORS
DESCRIPTORS: ACHING;DISCOMFORT
DESCRIPTORS: DISCOMFORT

## 2024-07-10 ASSESSMENT — PAIN SCALES - GENERAL
PAINLEVEL_OUTOF10: 7
PAINLEVEL_OUTOF10: 7
PAINLEVEL_OUTOF10: 3
PAINLEVEL_OUTOF10: 4

## 2024-07-10 ASSESSMENT — PAIN DESCRIPTION - LOCATION
LOCATION: KNEE
LOCATION: KNEE

## 2024-07-10 ASSESSMENT — LIFESTYLE VARIABLES
HOW OFTEN DO YOU HAVE A DRINK CONTAINING ALCOHOL: NEVER
HOW MANY STANDARD DRINKS CONTAINING ALCOHOL DO YOU HAVE ON A TYPICAL DAY: PATIENT DOES NOT DRINK

## 2024-07-10 ASSESSMENT — PAIN DESCRIPTION - ORIENTATION
ORIENTATION: RIGHT
ORIENTATION: RIGHT

## 2024-07-10 NOTE — H&P
V2.0  History and Physical      Name:  Nya Sanchez /Age/Sex: 1945  (79 y.o. female)   MRN & CSN:  [id number] & [id number] Encounter Date/Time: 2024 10:23 PM EDT   Location:   PCP: Iman Singh APRN - CNP       Hospital Day: 1    Assessment and Plan:     Patient is a 79 y.o. female who presented with fall     Fall  - Likely 2/2 physical deconditioning in the setting of rheumatoid arthritis, scoliosis, CT lumbar spine with compression of L1 likely chronic CT cervical spine and head without any new changes, PT/OT consult will likely need placement    R knee pain  - 2/2 degenrative changes no acute fractures on XR knee pain control with tylenol as needed.    UTI: on rocephin await urine cultures    Hyponatremia  - mild asymptomatic, Gentle IV fluid hydration and recheck in AM    HLD  -Continue statin     RA  -Continue methotrexate    Mood Disorder   -Continue home meds      Checklist:  Advanced directive: full  Diet: cardiac  DVT ppx: Lovenox  Sugar: BG goal of 140-180 while inpatient    Disposition: place in observation.  Estimated discharge: 1-2 day(s).  Current living situation: home.  Expected disposition: home.    Spoke with ED provider who recommended admission for the patient and I agree with that plan.  Personally reviewed lab studies and imaging.  EKG interpreted personally and results as stated above.  Imaging that was interpreted personally and results as stated above.      Comment: Please note this report has been produced using speech recognition software and may contain errors related to that system including errors in grammar, punctuation, and spelling, as well as words and phrases that may be inappropriate. If there are any questions or concerns please feel free to contact the dictating provider for clarification.         History of Present Illness:     Chief Complaint: Physical deconditioning  Nya Sanchez is a 79 y.o. female with pmh of RA, HLD, Mood disorder

## 2024-07-11 PROBLEM — J44.9 CHRONIC OBSTRUCTIVE PULMONARY DISEASE (HCC): Status: ACTIVE | Noted: 2024-07-11

## 2024-07-11 PROBLEM — M25.561 ACUTE PAIN OF RIGHT KNEE: Status: ACTIVE | Noted: 2024-07-11

## 2024-07-11 LAB
ANION GAP SERPL CALCULATED.3IONS-SCNC: 7 MMOL/L (ref 3–16)
BASOPHILS # BLD: 0 K/UL (ref 0–0.2)
BASOPHILS NFR BLD: 0.6 %
BUN SERPL-MCNC: 10 MG/DL (ref 7–20)
CALCIUM SERPL-MCNC: 9.6 MG/DL (ref 8.3–10.6)
CHLORIDE SERPL-SCNC: 98 MMOL/L (ref 99–110)
CO2 SERPL-SCNC: 31 MMOL/L (ref 21–32)
CREAT SERPL-MCNC: <0.5 MG/DL (ref 0.6–1.2)
DEPRECATED RDW RBC AUTO: 14.2 % (ref 12.4–15.4)
EOSINOPHIL # BLD: 0.3 K/UL (ref 0–0.6)
EOSINOPHIL NFR BLD: 5.2 %
GFR SERPLBLD CREATININE-BSD FMLA CKD-EPI: >90 ML/MIN/{1.73_M2}
GLUCOSE SERPL-MCNC: 104 MG/DL (ref 70–99)
HCT VFR BLD AUTO: 33.6 % (ref 36–48)
HGB BLD-MCNC: 11.4 G/DL (ref 12–16)
LYMPHOCYTES # BLD: 0.8 K/UL (ref 1–5.1)
LYMPHOCYTES NFR BLD: 12.5 %
MCH RBC QN AUTO: 32.5 PG (ref 26–34)
MCHC RBC AUTO-ENTMCNC: 34 G/DL (ref 31–36)
MCV RBC AUTO: 95.6 FL (ref 80–100)
MONOCYTES # BLD: 0.3 K/UL (ref 0–1.3)
MONOCYTES NFR BLD: 4.8 %
NEUTROPHILS # BLD: 4.8 K/UL (ref 1.7–7.7)
NEUTROPHILS NFR BLD: 76.9 %
PLATELET # BLD AUTO: 174 K/UL (ref 135–450)
PMV BLD AUTO: 7 FL (ref 5–10.5)
POTASSIUM SERPL-SCNC: 4.2 MMOL/L (ref 3.5–5.1)
RBC # BLD AUTO: 3.51 M/UL (ref 4–5.2)
SODIUM SERPL-SCNC: 136 MMOL/L (ref 136–145)
WBC # BLD AUTO: 6.2 K/UL (ref 4–11)

## 2024-07-11 PROCEDURE — 2700000000 HC OXYGEN THERAPY PER DAY

## 2024-07-11 PROCEDURE — 96366 THER/PROPH/DIAG IV INF ADDON: CPT

## 2024-07-11 PROCEDURE — 85025 COMPLETE CBC W/AUTO DIFF WBC: CPT

## 2024-07-11 PROCEDURE — 2580000003 HC RX 258: Performed by: STUDENT IN AN ORGANIZED HEALTH CARE EDUCATION/TRAINING PROGRAM

## 2024-07-11 PROCEDURE — 6360000002 HC RX W HCPCS

## 2024-07-11 PROCEDURE — 94761 N-INVAS EAR/PLS OXIMETRY MLT: CPT

## 2024-07-11 PROCEDURE — G0378 HOSPITAL OBSERVATION PER HR: HCPCS

## 2024-07-11 PROCEDURE — 96372 THER/PROPH/DIAG INJ SC/IM: CPT

## 2024-07-11 PROCEDURE — 6360000002 HC RX W HCPCS: Performed by: STUDENT IN AN ORGANIZED HEALTH CARE EDUCATION/TRAINING PROGRAM

## 2024-07-11 PROCEDURE — 97166 OT EVAL MOD COMPLEX 45 MIN: CPT

## 2024-07-11 PROCEDURE — 80048 BASIC METABOLIC PNL TOTAL CA: CPT

## 2024-07-11 PROCEDURE — 99221 1ST HOSP IP/OBS SF/LOW 40: CPT | Performed by: PHYSICIAN ASSISTANT

## 2024-07-11 PROCEDURE — 97530 THERAPEUTIC ACTIVITIES: CPT

## 2024-07-11 PROCEDURE — 6370000000 HC RX 637 (ALT 250 FOR IP): Performed by: STUDENT IN AN ORGANIZED HEALTH CARE EDUCATION/TRAINING PROGRAM

## 2024-07-11 PROCEDURE — 36415 COLL VENOUS BLD VENIPUNCTURE: CPT

## 2024-07-11 PROCEDURE — 6370000000 HC RX 637 (ALT 250 FOR IP)

## 2024-07-11 PROCEDURE — 97162 PT EVAL MOD COMPLEX 30 MIN: CPT

## 2024-07-11 PROCEDURE — 99232 SBSQ HOSP IP/OBS MODERATE 35: CPT | Performed by: INTERNAL MEDICINE

## 2024-07-11 PROCEDURE — 2580000003 HC RX 258

## 2024-07-11 RX ORDER — METHOTREXATE 2.5 MG/1
15 TABLET ORAL WEEKLY
Status: DISCONTINUED | OUTPATIENT
Start: 2024-07-11 | End: 2024-07-11 | Stop reason: SDUPTHER

## 2024-07-11 RX ORDER — FOLIC ACID 1 MG/1
1 TABLET ORAL DAILY
Status: DISCONTINUED | OUTPATIENT
Start: 2024-07-11 | End: 2024-07-15 | Stop reason: HOSPADM

## 2024-07-11 RX ORDER — HYDROCODONE BITARTRATE AND ACETAMINOPHEN 10; 325 MG/1; MG/1
1 TABLET ORAL EVERY 6 HOURS PRN
Status: DISCONTINUED | OUTPATIENT
Start: 2024-07-11 | End: 2024-07-15 | Stop reason: HOSPADM

## 2024-07-11 RX ORDER — METHOTREXATE 2.5 MG/1
15 TABLET ORAL WEEKLY
Status: DISCONTINUED | OUTPATIENT
Start: 2024-07-15 | End: 2024-07-15 | Stop reason: HOSPADM

## 2024-07-11 RX ORDER — PREDNISONE 20 MG/1
40 TABLET ORAL DAILY
Status: DISCONTINUED | OUTPATIENT
Start: 2024-07-11 | End: 2024-07-15 | Stop reason: HOSPADM

## 2024-07-11 RX ADMIN — SODIUM CHLORIDE, PRESERVATIVE FREE 10 ML: 5 INJECTION INTRAVENOUS at 08:51

## 2024-07-11 RX ADMIN — SERTRALINE 100 MG: 100 TABLET, FILM COATED ORAL at 08:51

## 2024-07-11 RX ADMIN — ATORVASTATIN CALCIUM 10 MG: 10 TABLET, FILM COATED ORAL at 08:51

## 2024-07-11 RX ADMIN — PREDNISONE 40 MG: 20 TABLET ORAL at 11:54

## 2024-07-11 RX ADMIN — SODIUM CHLORIDE, PRESERVATIVE FREE 10 ML: 5 INJECTION INTRAVENOUS at 21:35

## 2024-07-11 RX ADMIN — TRAMADOL HYDROCHLORIDE 50 MG: 50 TABLET, COATED ORAL at 06:40

## 2024-07-11 RX ADMIN — FOLIC ACID 1 MG: 1 TABLET ORAL at 11:54

## 2024-07-11 RX ADMIN — HYDROCODONE BITARTRATE AND ACETAMINOPHEN 1 TABLET: 10; 325 TABLET ORAL at 21:31

## 2024-07-11 RX ADMIN — ENOXAPARIN SODIUM 40 MG: 100 INJECTION SUBCUTANEOUS at 08:52

## 2024-07-11 RX ADMIN — CEFTRIAXONE SODIUM 1000 MG: 1 INJECTION, POWDER, FOR SOLUTION INTRAMUSCULAR; INTRAVENOUS at 21:35

## 2024-07-11 ASSESSMENT — PAIN SCALES - GENERAL
PAINLEVEL_OUTOF10: 9
PAINLEVEL_OUTOF10: 9
PAINLEVEL_OUTOF10: 2

## 2024-07-11 ASSESSMENT — PAIN - FUNCTIONAL ASSESSMENT
PAIN_FUNCTIONAL_ASSESSMENT: PREVENTS OR INTERFERES SOME ACTIVE ACTIVITIES AND ADLS
PAIN_FUNCTIONAL_ASSESSMENT: ACTIVITIES ARE NOT PREVENTED

## 2024-07-11 ASSESSMENT — PAIN DESCRIPTION - LOCATION
LOCATION: BACK;KNEE
LOCATION: BACK;KNEE

## 2024-07-11 ASSESSMENT — PAIN DESCRIPTION - ORIENTATION
ORIENTATION: RIGHT
ORIENTATION: RIGHT;LEFT

## 2024-07-11 ASSESSMENT — PAIN DESCRIPTION - DESCRIPTORS
DESCRIPTORS: ACHING;DISCOMFORT
DESCRIPTORS: ACHING

## 2024-07-11 NOTE — FLOWSHEET NOTE
07/11/24 0845   Vital Signs   Temp 97.5 °F (36.4 °C)   Temp Source Oral   Pulse 83   Heart Rate Source Monitor   Respirations 16   BP (!) 101/50   MAP (Calculated) 67   BP Location Right upper arm   BP Method Automatic   Patient Position High fowlers   Pain Assessment   Pain Assessment None - Denies Pain   Oxygen Therapy   SpO2 98 %   O2 Device Nasal cannula   O2 Flow Rate (L/min) 2 L/min     AM assessment completed. No complaints of pain or discomfort voiced.  No signs of symptoms of distress noted. Patient tolerated medications well. Respirations easy and even. Bed in lowest position, bed alarm in place and functioning properly, SR up x 2 and bed in low position. Call light within reach.     Bedside Mobility Assessment Tool (BMAT):     Assessment Level 1- Sit and Shake    1. From a semi-reclined position, ask patient to sit up and rotate to a seated position at the side of the bed. Can use the bedrail.    2. Ask patient to reach out and grab your hand and shake making sure patient reaches across his/her midline.   Pass- Patient is able to come to a seated position, maintain core strength. Maintains seated balance while reaching across midline. Move on to Assessment Level 2.     Assessment Level 2- Stretch and Point   1. With patient in seated position at the side of the bed, have patient place both feet on the floor (or stool) with knees no higher than hips.    2. Ask patient to stretch one leg and straighten the knee, then bend the ankle/flex and point the toes. If appropriate, repeat with the other leg.   Fail- Patient is unable to complete task. Patient is MOBILITY LEVEL 2.     Assessment Level 3- Stand   1. Ask patient to elevate off the bed or chair (seated to standing) using an assistive device (cane, bedrail).    2. Patient should be able to raise buttocks off be and hold for a count of five. May repeat once.   Fail- Patient unable to demonstrate standing stability. Patient is MOBILITY LEVEL 3.

## 2024-07-11 NOTE — CONSULTS
Surescript refill history.  The following list of meds may assist in determining home medications taken.  Confirmation/ verification of how or if the patient is taking is needed.  List may not be accurate or complete depending on when or how the patient was instructed to take and where the patient receives their meds from.  Not all sources report their refill data to surescripts.    Norco 10/325 filled 6/11/24 30 day supply is 120 tabs Q6hrs PRN pain?  Methotrexate 2.5mg, 72 tabs is an 84 day supply - 6 tabs/ week which day do they take them?   Eric Lara RPH PharmD 7/9/2024 9:40 PM          
no apparent distress, and appears stated age  MUSCULOSKELETAL:  RIGHT HIP:  redness absent  warmth absent  swelling absent  tenderness absent  range of motion stiffness noted with range of motion hip flexion tolerated pain mostly associated with the knee versus the hip internal/external rotation stiff with motion but believe this is patient driven due to pain within the  RIGHT KNEE:  redness absent  warmth absent  swelling present mild effusion noted  tenderness present generalized tenderness noted around the knee but mostly present to the lateral patellofemoral and lateral aspect of the knee.  There is noted contusion to the lateral aspect of the knee and traveling down lower leg  range of motion 0 to 60 degrees with noted discomfort with passive and active motion    DATA:    CBC:   Recent Labs     07/09/24  1925 07/10/24  0636 07/11/24  0549   WBC 9.0 8.5 6.2   HGB 11.4* 11.9* 11.4*    178 174     BMP:    Recent Labs     07/09/24  1925 07/10/24  0637 07/11/24  0549   * 134* 136   K 4.1 4.2 4.2   CL 94* 96* 98*   CO2 28 30 31   BUN 12 8 10   CREATININE <0.5* <0.5* <0.5*   GLUCOSE 114* 103* 104*     INR: No results for input(s): \"INR\" in the last 72 hours.    Radiology:   CT LUMBAR SPINE WO CONTRAST   Final Result   1. No acute intracranial abnormality.   2. No acute osseous abnormality of the cervical spine.   3. Severe lumbar scoliotic deformity partly degenerative.   4. L1-L2 fusion and likely superior endplate compression of L1. Acuity is   indeterminate.   5. Moderate to severe multilevel degenerative disc disease and hypertrophic   changes.   6. Severe central stenosis L4-L5 as well as bilateral foraminal stenosis   worse on the right.         CT CERVICAL SPINE WO CONTRAST   Final Result   1. No acute intracranial abnormality.   2. No acute osseous abnormality of the cervical spine.   3. Severe lumbar scoliotic deformity partly degenerative.   4. L1-L2 fusion and likely superior endplate

## 2024-07-11 NOTE — ACP (ADVANCE CARE PLANNING)
Advance Care Planning     General Advance Care Planning (ACP) Conversation    Date of Conversation: 7/11/2024  Conducted with: Patient with Decision Making Capacity  Other persons present: None    Healthcare Decision Maker: No healthcare decision makers have been documented.  Click here to complete HealthCare Decision Makers including selection of the Healthcare Decision Maker Relationship (ie \"Primary\")   Today we documented Decision Maker(s) consistent with Legal Next of Kin hierarchy.    Content/Action Overview:  DECLINED ACP Conversation - will revisit periodically  Reviewed DNR/DNI and patient elects Full Code (Attempt Resuscitation)        Length of Voluntary ACP Conversation in minutes:  <16 minutes (Non-Billable)    Stephanie Brennan

## 2024-07-12 PROCEDURE — 99232 SBSQ HOSP IP/OBS MODERATE 35: CPT | Performed by: INTERNAL MEDICINE

## 2024-07-12 PROCEDURE — G0378 HOSPITAL OBSERVATION PER HR: HCPCS

## 2024-07-12 PROCEDURE — 6370000000 HC RX 637 (ALT 250 FOR IP)

## 2024-07-12 PROCEDURE — 6370000000 HC RX 637 (ALT 250 FOR IP): Performed by: STUDENT IN AN ORGANIZED HEALTH CARE EDUCATION/TRAINING PROGRAM

## 2024-07-12 PROCEDURE — 6360000002 HC RX W HCPCS: Performed by: STUDENT IN AN ORGANIZED HEALTH CARE EDUCATION/TRAINING PROGRAM

## 2024-07-12 PROCEDURE — 97530 THERAPEUTIC ACTIVITIES: CPT

## 2024-07-12 PROCEDURE — 2700000000 HC OXYGEN THERAPY PER DAY

## 2024-07-12 PROCEDURE — 97535 SELF CARE MNGMENT TRAINING: CPT

## 2024-07-12 PROCEDURE — 96372 THER/PROPH/DIAG INJ SC/IM: CPT

## 2024-07-12 PROCEDURE — 2580000003 HC RX 258: Performed by: STUDENT IN AN ORGANIZED HEALTH CARE EDUCATION/TRAINING PROGRAM

## 2024-07-12 PROCEDURE — 94761 N-INVAS EAR/PLS OXIMETRY MLT: CPT

## 2024-07-12 RX ADMIN — SODIUM CHLORIDE, PRESERVATIVE FREE 10 ML: 5 INJECTION INTRAVENOUS at 20:59

## 2024-07-12 RX ADMIN — SODIUM CHLORIDE, PRESERVATIVE FREE 10 ML: 5 INJECTION INTRAVENOUS at 09:07

## 2024-07-12 RX ADMIN — FOLIC ACID 1 MG: 1 TABLET ORAL at 09:08

## 2024-07-12 RX ADMIN — SERTRALINE 100 MG: 100 TABLET, FILM COATED ORAL at 09:08

## 2024-07-12 RX ADMIN — PREDNISONE 40 MG: 20 TABLET ORAL at 09:08

## 2024-07-12 RX ADMIN — ENOXAPARIN SODIUM 40 MG: 100 INJECTION SUBCUTANEOUS at 09:08

## 2024-07-12 RX ADMIN — ATORVASTATIN CALCIUM 10 MG: 10 TABLET, FILM COATED ORAL at 09:08

## 2024-07-12 NOTE — FLOWSHEET NOTE
07/12/24 0723   Vital Signs   Temp 97.5 °F (36.4 °C)   Temp Source Oral   Pulse 72   Heart Rate Source Monitor   Respirations 17   BP (!) 126/55   MAP (Calculated) 79   BP Location Right upper arm   BP Method Automatic   Patient Position Semi fowlers   Pain Assessment   Pain Assessment None - Denies Pain   Oxygen Therapy   SpO2 97 %   O2 Device Nasal cannula   O2 Flow Rate (L/min) 4 L/min     AM assessment completed. No complaints of pain or discomfort voiced . No signs of symptoms of distress noted. Patient tolerated medications well. Respirations easy and even. Bed in lowest position, bed alarm in place and functioning properly, SR up x 2 and bed in low position. Call light within reach.     Bedside Mobility Assessment Tool (BMAT):     Assessment Level 1- Sit and Shake    1. From a semi-reclined position, ask patient to sit up and rotate to a seated position at the side of the bed. Can use the bedrail.    2. Ask patient to reach out and grab your hand and shake making sure patient reaches across his/her midline.   Fail- Patient is unable to perform tasks, patient is MOBILITY LEVEL 1.    Assessment Level 2- Stretch and Point   1. With patient in seated position at the side of the bed, have patient place both feet on the floor (or stool) with knees no higher than hips.    2. Ask patient to stretch one leg and straighten the knee, then bend the ankle/flex and point the toes. If appropriate, repeat with the other leg.   Fail- Patient is unable to complete task. Patient is MOBILITY LEVEL 2.     Assessment Level 3- Stand   1. Ask patient to elevate off the bed or chair (seated to standing) using an assistive device (cane, bedrail).    2. Patient should be able to raise buttocks off be and hold for a count of five. May repeat once.   Fail- Patient unable to demonstrate standing stability. Patient is MOBILITY LEVEL 3.     Assessment Level 4- Walk   1. Ask patient to march in place at bedside.    2. Then ask patient to

## 2024-07-13 PROCEDURE — 99232 SBSQ HOSP IP/OBS MODERATE 35: CPT | Performed by: PHYSICIAN ASSISTANT

## 2024-07-13 PROCEDURE — 6370000000 HC RX 637 (ALT 250 FOR IP): Performed by: STUDENT IN AN ORGANIZED HEALTH CARE EDUCATION/TRAINING PROGRAM

## 2024-07-13 PROCEDURE — G0378 HOSPITAL OBSERVATION PER HR: HCPCS

## 2024-07-13 PROCEDURE — 97535 SELF CARE MNGMENT TRAINING: CPT

## 2024-07-13 PROCEDURE — 96372 THER/PROPH/DIAG INJ SC/IM: CPT

## 2024-07-13 PROCEDURE — 2700000000 HC OXYGEN THERAPY PER DAY

## 2024-07-13 PROCEDURE — 2580000003 HC RX 258: Performed by: STUDENT IN AN ORGANIZED HEALTH CARE EDUCATION/TRAINING PROGRAM

## 2024-07-13 PROCEDURE — 6360000002 HC RX W HCPCS: Performed by: STUDENT IN AN ORGANIZED HEALTH CARE EDUCATION/TRAINING PROGRAM

## 2024-07-13 PROCEDURE — 97530 THERAPEUTIC ACTIVITIES: CPT

## 2024-07-13 PROCEDURE — 99232 SBSQ HOSP IP/OBS MODERATE 35: CPT | Performed by: INTERNAL MEDICINE

## 2024-07-13 PROCEDURE — 94761 N-INVAS EAR/PLS OXIMETRY MLT: CPT

## 2024-07-13 PROCEDURE — 6370000000 HC RX 637 (ALT 250 FOR IP)

## 2024-07-13 RX ADMIN — POLYETHYLENE GLYCOL (3350) 17 G: 17 POWDER, FOR SOLUTION ORAL at 08:18

## 2024-07-13 RX ADMIN — ENOXAPARIN SODIUM 40 MG: 100 INJECTION SUBCUTANEOUS at 08:18

## 2024-07-13 RX ADMIN — SODIUM CHLORIDE, PRESERVATIVE FREE 10 ML: 5 INJECTION INTRAVENOUS at 08:19

## 2024-07-13 RX ADMIN — SODIUM CHLORIDE, PRESERVATIVE FREE 10 ML: 5 INJECTION INTRAVENOUS at 22:44

## 2024-07-13 RX ADMIN — HYDROCODONE BITARTRATE AND ACETAMINOPHEN 1 TABLET: 10; 325 TABLET ORAL at 11:11

## 2024-07-13 RX ADMIN — SERTRALINE 100 MG: 100 TABLET, FILM COATED ORAL at 08:18

## 2024-07-13 RX ADMIN — FOLIC ACID 1 MG: 1 TABLET ORAL at 08:18

## 2024-07-13 ASSESSMENT — PAIN SCALES - GENERAL
PAINLEVEL_OUTOF10: 3
PAINLEVEL_OUTOF10: 5
PAINLEVEL_OUTOF10: 4
PAINLEVEL_OUTOF10: 9
PAINLEVEL_OUTOF10: 5
PAINLEVEL_OUTOF10: 0

## 2024-07-13 ASSESSMENT — PAIN DESCRIPTION - PAIN TYPE: TYPE: ACUTE PAIN

## 2024-07-13 ASSESSMENT — PAIN DESCRIPTION - ORIENTATION
ORIENTATION: RIGHT
ORIENTATION: RIGHT;MID

## 2024-07-13 ASSESSMENT — PAIN SCALES - WONG BAKER
WONGBAKER_NUMERICALRESPONSE: NO HURT
WONGBAKER_NUMERICALRESPONSE: NO HURT

## 2024-07-13 ASSESSMENT — PAIN DESCRIPTION - LOCATION
LOCATION: KNEE;BACK
LOCATION: KNEE

## 2024-07-13 ASSESSMENT — PAIN DESCRIPTION - FREQUENCY: FREQUENCY: INTERMITTENT

## 2024-07-13 ASSESSMENT — PAIN DESCRIPTION - ONSET: ONSET: ON-GOING

## 2024-07-13 ASSESSMENT — PAIN DESCRIPTION - DESCRIPTORS
DESCRIPTORS: ACHING;DISCOMFORT
DESCRIPTORS: ACHING;DISCOMFORT

## 2024-07-13 ASSESSMENT — PAIN - FUNCTIONAL ASSESSMENT
PAIN_FUNCTIONAL_ASSESSMENT: PREVENTS OR INTERFERES SOME ACTIVE ACTIVITIES AND ADLS
PAIN_FUNCTIONAL_ASSESSMENT: PREVENTS OR INTERFERES SOME ACTIVE ACTIVITIES AND ADLS

## 2024-07-13 NOTE — FLOWSHEET NOTE
07/13/24 1111   Vital Signs   Respirations 16   Pain Assessment   Pain Assessment 0-10   Pain Level 9   Patient's Stated Pain Goal 0 - No pain   Pain Location Knee;Back   Pain Orientation Right;Mid   Pain Descriptors Aching;Discomfort   Functional Pain Assessment Prevents or interferes some active activities and ADLs   Non-Pharmaceutical Pain Intervention(s) Ice;Repositioned;Rest   Opioid-Induced Sedation   POSS Score 1     Prn norco given due to increased pain in right knee and middle /lower back pain

## 2024-07-13 NOTE — FLOWSHEET NOTE
Patient awake in bed. Patient repositioned for comfort with pillow support. Patient refused the need for pain meds at this time. Patient refused an ice pack at this time. Pure-wick in place. Bed alarm in place. Call light and bedside table within reach.    07/13/24 0510   Vital Signs   Temp 97.9 °F (36.6 °C)   Temp Source Oral   Pulse 80   Heart Rate Source Monitor   Respirations 16   /60   MAP (Calculated) 73   BP Location Left upper arm   BP Method Automatic   Patient Position Semi fowlers   Oxygen Therapy   SpO2 95 %   O2 Device Nasal cannula   O2 Flow Rate (L/min) 4 L/min

## 2024-07-13 NOTE — FLOWSHEET NOTE
07/13/24 0815   Vital Signs   Temp 98.2 °F (36.8 °C)   Temp Source Oral   Pulse 76   Heart Rate Source Monitor   Respirations 18   BP (!) 94/58   MAP (Calculated) 70   BP Location Left upper arm   BP Method Automatic   Patient Position Semi fowlers   Pain Assessment   Pain Assessment 0-10   Pain Level 5   Pain Location Knee   Pain Orientation Right   Pain Descriptors Aching;Discomfort   Functional Pain Assessment Prevents or interferes some active activities and ADLs   Pain Type Acute pain   Pain Frequency Intermittent   Pain Onset On-going   Non-Pharmaceutical Pain Intervention(s) Repositioned;Rest   Response to Pain Intervention Patient satisfied   Opioid-Induced Sedation   POSS Score 1   Oxygen Therapy   SpO2 93 %   O2 Device Nasal cannula   O2 Flow Rate (L/min) 3 L/min     AM assessment completed. Complaints of pain voiced but prn medication refused. No signs or symptoms of distress noted. Patient tolerated AM medications well. Respirations easy and even. Bed in lowest position, bed alarm in place and functioning properly, bed rails x2 up,  Call light within reach.     Bedside Mobility Assessment Tool (BMAT):     Assessment Level 1- Sit and Shake    1. From a semi-reclined position, ask patient to sit up and rotate to a seated position at the side of the bed. Can use the bedrail.    2. Ask patient to reach out and grab your hand and shake making sure patient reaches across his/her midline.   Pass- Patient is able to come to a seated position, maintain core strength. Maintains seated balance while reaching across midline. Move on to Assessment Level 2.     Assessment Level 2- Stretch and Point   1. With patient in seated position at the side of the bed, have patient place both feet on the floor (or stool) with knees no higher than hips.    2. Ask patient to stretch one leg and straighten the knee, then bend the ankle/flex and point the toes. If appropriate, repeat with the other leg.   Fail- Patient is unable

## 2024-07-13 NOTE — FLOWSHEET NOTE
Shift assessment complete. See doc flow. Nightly medications given see MAR. A/O x4. Fall at home. Patient reports Rt knee pain, refused PRN Pain meds, even Tylenol. Ice pack in place to Rt knee. Patient repositioned for comfort. Patient needing SNF placement. Pure-wick in place. No other needs noted at this time. Bed alarm in place. Call light and bedside table within easy reach.    07/12/24 2058   Vital Signs   Temp 98.5 °F (36.9 °C)   Temp Source Oral   Pulse 91   Heart Rate Source Monitor   Respirations 16   /60   MAP (Calculated) 78   BP Location Right upper arm   BP Method Automatic   Patient Position Semi fowlers   Oxygen Therapy   SpO2 96 %   O2 Device Nasal cannula   O2 Flow Rate (L/min) 4 L/min

## 2024-07-14 LAB
BILIRUB UR QL STRIP.AUTO: NEGATIVE
CLARITY UR: ABNORMAL
COLOR UR: YELLOW
GLUCOSE UR STRIP.AUTO-MCNC: NEGATIVE MG/DL
HGB UR QL STRIP.AUTO: NEGATIVE
KETONES UR STRIP.AUTO-MCNC: NEGATIVE MG/DL
LEUKOCYTE ESTERASE UR QL STRIP.AUTO: NEGATIVE
NITRITE UR QL STRIP.AUTO: NEGATIVE
PH UR STRIP.AUTO: 7.5 [PH] (ref 5–8)
PROT UR STRIP.AUTO-MCNC: NEGATIVE MG/DL
SP GR UR STRIP.AUTO: 1.02 (ref 1–1.03)
UA DIPSTICK W REFLEX MICRO PNL UR: ABNORMAL
URN SPEC COLLECT METH UR: ABNORMAL
UROBILINOGEN UR STRIP-ACNC: 1 E.U./DL

## 2024-07-14 PROCEDURE — G0378 HOSPITAL OBSERVATION PER HR: HCPCS

## 2024-07-14 PROCEDURE — 94761 N-INVAS EAR/PLS OXIMETRY MLT: CPT

## 2024-07-14 PROCEDURE — 6370000000 HC RX 637 (ALT 250 FOR IP)

## 2024-07-14 PROCEDURE — 2700000000 HC OXYGEN THERAPY PER DAY

## 2024-07-14 PROCEDURE — 6370000000 HC RX 637 (ALT 250 FOR IP): Performed by: STUDENT IN AN ORGANIZED HEALTH CARE EDUCATION/TRAINING PROGRAM

## 2024-07-14 PROCEDURE — 96372 THER/PROPH/DIAG INJ SC/IM: CPT

## 2024-07-14 PROCEDURE — 99231 SBSQ HOSP IP/OBS SF/LOW 25: CPT | Performed by: INTERNAL MEDICINE

## 2024-07-14 PROCEDURE — 81003 URINALYSIS AUTO W/O SCOPE: CPT

## 2024-07-14 PROCEDURE — 2580000003 HC RX 258: Performed by: STUDENT IN AN ORGANIZED HEALTH CARE EDUCATION/TRAINING PROGRAM

## 2024-07-14 PROCEDURE — 6360000002 HC RX W HCPCS: Performed by: STUDENT IN AN ORGANIZED HEALTH CARE EDUCATION/TRAINING PROGRAM

## 2024-07-14 RX ADMIN — PREDNISONE 40 MG: 20 TABLET ORAL at 09:07

## 2024-07-14 RX ADMIN — HYDROCODONE BITARTRATE AND ACETAMINOPHEN 1 TABLET: 10; 325 TABLET ORAL at 05:37

## 2024-07-14 RX ADMIN — FOLIC ACID 1 MG: 1 TABLET ORAL at 09:07

## 2024-07-14 RX ADMIN — ENOXAPARIN SODIUM 40 MG: 100 INJECTION SUBCUTANEOUS at 09:07

## 2024-07-14 RX ADMIN — SODIUM CHLORIDE, PRESERVATIVE FREE 10 ML: 5 INJECTION INTRAVENOUS at 20:48

## 2024-07-14 RX ADMIN — ATORVASTATIN CALCIUM 10 MG: 10 TABLET, FILM COATED ORAL at 09:07

## 2024-07-14 RX ADMIN — SERTRALINE 100 MG: 100 TABLET, FILM COATED ORAL at 09:07

## 2024-07-14 RX ADMIN — SODIUM CHLORIDE, PRESERVATIVE FREE 10 ML: 5 INJECTION INTRAVENOUS at 09:08

## 2024-07-14 ASSESSMENT — PAIN DESCRIPTION - FREQUENCY: FREQUENCY: CONTINUOUS

## 2024-07-14 ASSESSMENT — PAIN DESCRIPTION - PAIN TYPE: TYPE: ACUTE PAIN

## 2024-07-14 ASSESSMENT — PAIN DESCRIPTION - DESCRIPTORS: DESCRIPTORS: ACHING;DISCOMFORT;SHARP;SORE

## 2024-07-14 ASSESSMENT — PAIN DESCRIPTION - LOCATION: LOCATION: KNEE

## 2024-07-14 ASSESSMENT — PAIN SCALES - GENERAL
PAINLEVEL_OUTOF10: 3
PAINLEVEL_OUTOF10: 5
PAINLEVEL_OUTOF10: 0

## 2024-07-14 ASSESSMENT — PAIN DESCRIPTION - ORIENTATION: ORIENTATION: RIGHT

## 2024-07-14 ASSESSMENT — PAIN DESCRIPTION - ONSET: ONSET: ON-GOING

## 2024-07-14 ASSESSMENT — PAIN - FUNCTIONAL ASSESSMENT: PAIN_FUNCTIONAL_ASSESSMENT: PREVENTS OR INTERFERES SOME ACTIVE ACTIVITIES AND ADLS

## 2024-07-14 NOTE — FLOWSHEET NOTE
Shift assessment complete. See doc flow. Nightly medications given see MAR. A/O x4. Fall at home. Working w/ PT/OT, stedy x2 w/ knee immobilizer to ambulate. Pt reports mild pain to Rt knee, denies the need for pain meds and refuses even Tylenol at this time. Patient accepted an ice pack to the Rt knee. Patient repositioned in the bed w/ pillow support. Pure-wick in place, urine malodorous. No other needs noted at this time. Bed alarm in place. Call light and bedside table within easy reach.    07/13/24 2243   Vital Signs   Temp 98.6 °F (37 °C)   Temp Source Oral   Pulse 83   Heart Rate Source Monitor   Respirations 18   BP 93/60   MAP (Calculated) 71   BP Location Left upper arm   BP Method Automatic   Patient Position Semi fowlers   Pain Assessment   Pain Assessment None - Denies Pain   Pain Level 0   Oxygen Therapy   SpO2 97 %   O2 Device Nasal cannula   O2 Flow Rate (L/min) 3 L/min

## 2024-07-14 NOTE — FLOWSHEET NOTE
Patient resting in bed quietly. VS taken. Pt continues to be hypotensive. Mild knee pain reported while resting, continues to deny the need for pain meds. Pt repositioned for comfort. Pure-wick in place, urine malodorous. No other needs noted at this time. Bed alarm in place. Call light and bedside table within reach.    07/14/24 0219   Vital Signs   Temp 98.4 °F (36.9 °C)   Temp Source Oral   Pulse 81   Heart Rate Source Monitor   Respirations 18   /61   MAP (Calculated) 77   BP Location Left upper arm   BP Method Automatic   Patient Position Semi fowlers   Oxygen Therapy   SpO2 98 %   O2 Device Nasal cannula   O2 Flow Rate (L/min) 3 L/min   Height and Weight   Weight - Scale 71.2 kg (156 lb 15.5 oz)   Weight Method Bed scale   BMI (Calculated) 30.7

## 2024-07-14 NOTE — FLOWSHEET NOTE
07/14/24 0900   Vital Signs   Temp 97.7 °F (36.5 °C)   Temp Source Oral   Pulse 76   Heart Rate Source Monitor   Respirations 18   BP 92/68   MAP (Calculated) 76   Pain Assessment   Pain Assessment None - Denies Pain   Care Plan - Pain Goals   Verbalizes/displays adequate comfort level or baseline comfort level Encourage patient to monitor pain and request assistance;Assess pain using appropriate pain scale;Administer analgesics based on type and severity of pain and evaluate response;Implement non-pharmacological measures as appropriate and evaluate response;Consider cultural and social influences on pain and pain management;Notify Licensed Independent Practitioner if interventions unsuccessful or patient reports new pain   Opioid-Induced Sedation   POSS Score 1   Oxygen Therapy   SpO2 96 %   O2 Device Nasal cannula   O2 Flow Rate (L/min) 3 L/min     VSS. Skin pink warm and dry. Fed self 100% of breakfast. Denies complains of. Patient voiding clear sydnee urine. Skin pink warm and dry. All needs met. Bed in low position, call bell and bedside table within reach. Bed alarm on. Patient watching TV.

## 2024-07-14 NOTE — DISCHARGE INSTR - COC
Continuity of Care Form    Patient Name: Nya Sanchez   :  1945  MRN:  [id number]    Admit date:  2024  Discharge date:  7/15/24    Code Status Order: Full Code   Advance Directives:     Admitting Physician:  Mary Ellen Naranjo MD  PCP: Iman Singh, APRN - CNP    Discharging Nurse: Charlene URIOSTEGUI  Discharging Hospital Unit/Room#: 0229/0229-02  Discharging Unit Phone Number: [phone]    Emergency Contact:   Extended Emergency Contact Information  Primary Emergency Contact: Stefania Ayoub  Home Phone: [phone]  Relation: Niece/Nephew    Past Surgical History:  Past Surgical History:   Procedure Laterality Date    BACK SURGERY      FOOT SURGERY         Immunization History:   Immunization History   Administered Date(s) Administered    Pneumococcal Conjugate 7-valent (Prevnar7) 2011       Active Problems:  Patient Active Problem List   Diagnosis Code    Acute respiratory failure (HCC) J96.00    Acute bronchitis J20.9    Anxiety F41.9    Scoliosis M41.9    Physical deconditioning R53.81    Fall W19.XXXA    Acute cystitis without hematuria N30.00    Unable to ambulate R26.2    Chronic obstructive pulmonary disease (HCC) J44.9    Acute pain of right knee M25.561       Isolation/Infection:   Isolation            No Isolation          Patient Infection Status       None to display            Nurse Assessment:  Last Vital Signs: BP 96/63   Pulse 81   Temp 98.1 °F (36.7 °C) (Oral)   Resp 18   Ht 1.524 m (5')   Wt 71.2 kg (156 lb 15.5 oz)   SpO2 94%   BMI 30.66 kg/m²     Last documented pain score (0-10 scale): Pain Level: 3  Last Weight:   Wt Readings from Last 1 Encounters:   24 71.2 kg (156 lb 15.5 oz)     Mental Status:  oriented and alert    IV Access:  - None    Nursing Mobility/ADLs:  Walking   Dependent  Transfer  Dependent  Bathing  Assisted  Dressing  Assisted  Toileting  Dependent  Feeding  Independent  Med Admin  Assisted  Med Delivery   whole    Wound Care Documentation and

## 2024-07-15 VITALS
DIASTOLIC BLOOD PRESSURE: 50 MMHG | OXYGEN SATURATION: 95 % | BODY MASS INDEX: 30.57 KG/M2 | WEIGHT: 155.7 LBS | HEART RATE: 76 BPM | RESPIRATION RATE: 16 BRPM | HEIGHT: 60 IN | SYSTOLIC BLOOD PRESSURE: 95 MMHG | TEMPERATURE: 97.9 F

## 2024-07-15 PROCEDURE — G0378 HOSPITAL OBSERVATION PER HR: HCPCS

## 2024-07-15 PROCEDURE — 96372 THER/PROPH/DIAG INJ SC/IM: CPT

## 2024-07-15 PROCEDURE — 97530 THERAPEUTIC ACTIVITIES: CPT

## 2024-07-15 PROCEDURE — 6360000002 HC RX W HCPCS: Performed by: STUDENT IN AN ORGANIZED HEALTH CARE EDUCATION/TRAINING PROGRAM

## 2024-07-15 PROCEDURE — 94761 N-INVAS EAR/PLS OXIMETRY MLT: CPT

## 2024-07-15 PROCEDURE — 2700000000 HC OXYGEN THERAPY PER DAY

## 2024-07-15 PROCEDURE — 6370000000 HC RX 637 (ALT 250 FOR IP): Performed by: STUDENT IN AN ORGANIZED HEALTH CARE EDUCATION/TRAINING PROGRAM

## 2024-07-15 PROCEDURE — 2580000003 HC RX 258: Performed by: STUDENT IN AN ORGANIZED HEALTH CARE EDUCATION/TRAINING PROGRAM

## 2024-07-15 PROCEDURE — 99239 HOSP IP/OBS DSCHRG MGMT >30: CPT | Performed by: NURSE PRACTITIONER

## 2024-07-15 PROCEDURE — 6370000000 HC RX 637 (ALT 250 FOR IP)

## 2024-07-15 PROCEDURE — 6360000002 HC RX W HCPCS

## 2024-07-15 PROCEDURE — 97535 SELF CARE MNGMENT TRAINING: CPT

## 2024-07-15 RX ORDER — PREDNISONE 20 MG/1
40 TABLET ORAL DAILY
Qty: 2 TABLET | Refills: 0
Start: 2024-07-16 | End: 2024-07-17

## 2024-07-15 RX ADMIN — SODIUM CHLORIDE, PRESERVATIVE FREE 10 ML: 5 INJECTION INTRAVENOUS at 08:38

## 2024-07-15 RX ADMIN — HYDROCODONE BITARTRATE AND ACETAMINOPHEN 1 TABLET: 10; 325 TABLET ORAL at 00:10

## 2024-07-15 RX ADMIN — PREDNISONE 40 MG: 20 TABLET ORAL at 08:35

## 2024-07-15 RX ADMIN — FOLIC ACID 1 MG: 1 TABLET ORAL at 08:35

## 2024-07-15 RX ADMIN — HYDROCODONE BITARTRATE AND ACETAMINOPHEN 1 TABLET: 10; 325 TABLET ORAL at 08:47

## 2024-07-15 RX ADMIN — ATORVASTATIN CALCIUM 10 MG: 10 TABLET, FILM COATED ORAL at 08:35

## 2024-07-15 RX ADMIN — ENOXAPARIN SODIUM 40 MG: 100 INJECTION SUBCUTANEOUS at 08:35

## 2024-07-15 RX ADMIN — SERTRALINE 100 MG: 100 TABLET, FILM COATED ORAL at 08:35

## 2024-07-15 ASSESSMENT — PAIN SCALES - GENERAL
PAINLEVEL_OUTOF10: 6
PAINLEVEL_OUTOF10: 9
PAINLEVEL_OUTOF10: 0
PAINLEVEL_OUTOF10: 3

## 2024-07-15 ASSESSMENT — PAIN DESCRIPTION - DESCRIPTORS
DESCRIPTORS: ACHING;CRUSHING;DISCOMFORT;CRAMPING
DESCRIPTORS: ACHING

## 2024-07-15 ASSESSMENT — PAIN DESCRIPTION - ORIENTATION
ORIENTATION: RIGHT;LOWER
ORIENTATION: RIGHT

## 2024-07-15 ASSESSMENT — PAIN DESCRIPTION - LOCATION
LOCATION: BACK
LOCATION: KNEE

## 2024-07-15 NOTE — CARE COORDINATION
CASE MANAGEMENT DISCHARGE SUMMARY      Discharge to: [facility]    Precertification completed: Yes    Hospital Exemption Notification (HENS) completed: yes    IMM given: NA - OBS    New Durable Medical Equipment ordered/agency:     Transportation:       Medical Transport explained to pt/family. Pt/family voice no agency preference.      Agency used: [facility]     time: 1615     Ambulance form completed: Yes    Confirmed discharge plan with:     Patient: yes     Family:  yes - called      Facility/Agency, name:  [facility]   Phone number for report to facility: [phone]     RN, name: Charlene URIOSTEGUI    Note: Discharging nurse to complete TRACI, reconcile AVS, and place final copy with patient's discharge packet. RN to ensure that written prescriptions for  Level II medications are sent with patient to the facility as per protocol.        
   07/14/24 1000   Service Assessment   Patient Orientation Alert and Oriented;Person;Place   Cognition Alert   History Provided By Patient   Primary Caregiver Self  (per patient)   Support Systems Family Members;Spouse/Significant Other   Patient's Healthcare Decision Maker is: Legal Next of Kin  (spouse)   PCP Verified by CM Yes   Last Visit to PCP Within last year  (Uses specialist as her PCP)   Prior Functional Level Assistance with the following:;Housework;Other (see comment)  (Use of Rollator. Daughter helps with cleaning and laundry)   Current Functional Level Mobility   Can patient return to prior living arrangement Other (see comment)  (Would like rehab)   Ability to make needs known: Good   Family able to assist with home care needs: Yes   Would you like for me to discuss the discharge plan with any other family members/significant others, and if so, who? No   Financial Resources Medicare   Community Resources None   Discharge Planning   Type of Residence Skilled Nursing Facility   Living Arrangements Family Members;Spouse/Significant Other   Current Services Prior To Admission Oxygen Therapy  (2-3 L with [facility])   Current DME Prior to Arrival Walker;Oxygen Therapy (Comment)   Potential Assistance Needed Skilled Nursing Facility   DME Ordered? No   Potential Assistance Purchasing Medications No   Type of Home Care Services None   Patient expects to be discharged to: Skilled nursing facility   Follow Up Appointment: Best Day/Time    (self)   One/Two Story Residence One story   History of falls? 1  (one fall prior to admission)     Case Management Assessment  Initial Evaluation    Date/Time of Evaluation: 7/14/2024 6:08 PM  Assessment Completed by: Teresa Boeck, RN    If patient is discharged prior to next notation, then this note serves as note for discharge by case management.    Patient Name: Nya Sanchez                   YOB: 1945  Diagnosis: Physical deconditioning 
7/13 - Met with patient who advised she is going to an ARU. Thinks precert was already started.     7/13 - Met with patient and with Dr. Servin. Dr. Servin advised the patient will not be able to handle iintensity of ARU. The patient needs rehab in a nursing facility. The patient is in agreement.     7/13 LVM for [facility] making referral.    7/14 - spoke with [facility] who did not receive VM. Made referral now. [facility] will review.    4:52 - [facility] accept patient. Need to ask if she can bring her oral chemo pill to the facility. Also we will do the precert - when to start?    Yes the patient can bring her own chemo pills to [facility].   [facility] update on this.    Precert started and approved.  [facility] updated on this.   
Dr. Servin requesting CM discuss SNF placement with the patient.    Patient stated she thought she was already referred to an ARU. OT/PT recs are for SNF. Dr. Servin advised the patient will not qualify for an ARU.    Dr. Servin advised the patient she will not be able to handle the intense therapy at an ARU.    Per chart review no qualifiers for an ARU.    Patient in agreement to:    1) [facility] making referral to [facility]    2) [facility]      
Reviewed chart and met withpt. Discussed dcp and pt would like ARU. Noted ARU is following and. Discussed with pt SNF LOC as ARU may be to much therapy. Will follow for appropriate LOC at dc   
Reviewed chart, met with pt bedside. Spoke with [facility] who states their DC will not be leaving and they will not have a bed until next week. Pt updated and states she would like to go to [facility]. Referral called to [facility]. Will need precert. Will continue to monitor.     1500 - Precert back to [facility]. [facility] made aware and can accept today. Pt and MD updated.  
discuss the discharge plan with any other family members/significant others, and if so, who? Yes (family)  Plans to Return to Present Housing: Unknown at present  Other Identified Issues/Barriers to RETURNING to current housing: none  Potential Assistance needed at discharge: Skilled Nursing Facility            Potential DME:    Patient expects to discharge to: House  Plan for transportation at discharge:      Financial    Payor: [employer] MEDICARE / Plan: [employer] GOLD PLUS HMO / Product Type: *No Product type* /     Does insurance require precert for SNF: Yes    Potential assistance Purchasing Medications: No  Meds-to-Beds request: Yes      [facility] [id number] - [place], [place] - [address] -  [phone] - F [phone]  [address] [address]  Phone: [phone] Fax: [phone]      Notes:    Factors facilitating achievement of predicted outcomes: Cooperative and Pleasant    Barriers to discharge: Lower extremity weakness, precert if SNF is needed    Additional Case Management Notes: Reviewed chart and met with pt at beside. From house with family, plan to TBD, need PT recs.  and family helps with all ADLs. Pt uses [employer] to get get around. Has WC and cane as well. Frequent falls. No HC. O2 via [facility]. Will continue to monitor.       The Plan for Transition of Care is related to the following treatment goals of Physical deconditioning [R53.81]  Unable to ambulate [R26.2]  Acute cystitis without hematuria [N30.00]  Fall, initial encounter [W19.XXXA]    IF APPLICABLE: The Patient and/or patient representative Nya and her family were provided with a choice of provider and agrees with the discharge plan. Freedom of choice list with basic dialogue that supports the patient's individualized plan of care/goals and shares the quality data associated with the providers was provided to:     Patient Representative Name:       The Patient and/or Patient Representative Agree with

## 2024-07-15 NOTE — FLOWSHEET NOTE
07/14/24 2045   Vital Signs   Temp 98.1 °F (36.7 °C)   Temp Source Oral   Pulse 98   Heart Rate Source Monitor   Respirations 18   BP (!) 93/50   MAP (Calculated) 64   MAP (mmHg) (!) 62   BP Location Left upper arm   BP Method Automatic   Patient Position Semi fowlers   Pain Assessment   Pain Assessment None - Denies Pain   Pain Level 0   Opioid-Induced Sedation   POSS Score 1   Oxygen Therapy   SpO2 95 %   O2 Device Nasal cannula   O2 Flow Rate (L/min) 3 L/min     Shift assessment complete, see flow sheet, schedule medications given, see MAR. IV infusing without difficulty at this time. Pt VSS. Pt alert, oriented x 4 , on 3l nc.  Bed in lowest position,  Call light and bed side table within reach, pt educated on use of call light if needing assist., pt verbalized understanding, denies further questions at this time. Denies any needs at this time, continue to monitor.  Bedside Mobility Assessment Tool (BMAT):     Assessment Level 1- Sit and Shake    1. From a semi-reclined position, ask patient to sit up and rotate to a seated position at the side of the bed. Can use the bedrail.    2. Ask patient to reach out and grab your hand and shake making sure patient reaches across his/her midline.   Pass- Patient is able to come to a seated position, maintain core strength. Maintains seated balance while reaching across midline. Move on to Assessment Level 2.     Assessment Level 2- Stretch and Point   1. With patient in seated position at the side of the bed, have patient place both feet on the floor (or stool) with knees no higher than hips.    2. Ask patient to stretch one leg and straighten the knee, then bend the ankle/flex and point the toes. If appropriate, repeat with the other leg.   Fail- Patient is unable to complete task. Patient is MOBILITY LEVEL 2.     Assessment Level 3- Stand   1. Ask patient to elevate off the bed or chair (seated to standing) using an assistive device (cane, bedrail).    2. Patient

## 2024-07-15 NOTE — PLAN OF CARE
Problem: Skin/Tissue Integrity  Goal: Absence of new skin breakdown  Description: 1.  Monitor for areas of redness and/or skin breakdown  2.  Assess vascular access sites hourly  3.  Every 4-6 hours minimum:  Change oxygen saturation probe site  4.  Every 4-6 hours:  If on nasal continuous positive airway pressure, respiratory therapy assess nares and determine need for appliance change or resting period.  7/11/2024 1003 by Charlene Hammond RN  Outcome: Progressing  7/10/2024 2243 by Arleen Webb RN  Outcome: Progressing     Problem: Chronic Conditions and Co-morbidities  Goal: Patient's chronic conditions and co-morbidity symptoms are monitored and maintained or improved  7/11/2024 1003 by Charlene Hammond RN  Outcome: Progressing  7/10/2024 2243 by Arleen Webb RN  Outcome: Progressing     Problem: Safety - Adult  Goal: Free from fall injury  7/11/2024 1003 by Charlene Hammond RN  Outcome: Progressing  7/10/2024 2243 by Arleen Webb RN  Outcome: Progressing     Problem: Discharge Planning  Goal: Discharge to home or other facility with appropriate resources  7/11/2024 1003 by Charlene Hammond RN  Outcome: Progressing  7/10/2024 2243 by Arleen Webb RN  Outcome: Progressing     
  Problem: Skin/Tissue Integrity  Goal: Absence of new skin breakdown  Description: 1.  Monitor for areas of redness and/or skin breakdown  2.  Assess vascular access sites hourly  3.  Every 4-6 hours minimum:  Change oxygen saturation probe site  4.  Every 4-6 hours:  If on nasal continuous positive airway pressure, respiratory therapy assess nares and determine need for appliance change or resting period.  7/12/2024 0953 by Charlene Hammond RN  Outcome: Progressing  7/12/2024 0008 by Arleen Webb RN  Outcome: Progressing     Problem: Chronic Conditions and Co-morbidities  Goal: Patient's chronic conditions and co-morbidity symptoms are monitored and maintained or improved  Outcome: Progressing     Problem: Safety - Adult  Goal: Free from fall injury  7/12/2024 0953 by Charlene Hammond RN  Outcome: Progressing  7/12/2024 0008 by Arleen Webb RN  Outcome: Progressing     Problem: Discharge Planning  Goal: Discharge to home or other facility with appropriate resources  Outcome: Progressing     
  Problem: Skin/Tissue Integrity  Goal: Absence of new skin breakdown  Description: 1.  Monitor for areas of redness and/or skin breakdown  2.  Assess vascular access sites hourly  3.  Every 4-6 hours minimum:  Change oxygen saturation probe site  4.  Every 4-6 hours:  If on nasal continuous positive airway pressure, respiratory therapy assess nares and determine need for appliance change or resting period.  7/12/2024 2059 by Corey Willard RN  Outcome: Progressing  7/12/2024 0953 by Charlene Hammond RN  Outcome: Progressing     Problem: Chronic Conditions and Co-morbidities  Goal: Patient's chronic conditions and co-morbidity symptoms are monitored and maintained or improved  7/12/2024 2059 by Corey Willard RN  Outcome: Progressing  7/12/2024 0953 by Charlene Hammond RN  Outcome: Progressing     Problem: Safety - Adult  Goal: Free from fall injury  7/12/2024 2059 by Corey Willard RN  Outcome: Progressing  7/12/2024 0953 by Charlene Hammond RN  Outcome: Progressing     Problem: Discharge Planning  Goal: Discharge to home or other facility with appropriate resources  7/12/2024 2059 by Corey Willard RN  Outcome: Progressing  7/12/2024 0953 by Charlene Hammond RN  Outcome: Progressing     Problem: Pain  Goal: Verbalizes/displays adequate comfort level or baseline comfort level  7/12/2024 2059 by Corey Willard RN  Outcome: Progressing  7/12/2024 0953 by Charlene Hammond RN  Outcome: Progressing     
  Problem: Skin/Tissue Integrity  Goal: Absence of new skin breakdown  Description: 1.  Monitor for areas of redness and/or skin breakdown  2.  Assess vascular access sites hourly  3.  Every 4-6 hours minimum:  Change oxygen saturation probe site  4.  Every 4-6 hours:  If on nasal continuous positive airway pressure, respiratory therapy assess nares and determine need for appliance change or resting period.  7/13/2024 0938 by Barbie Wolfe RN  Outcome: Progressing  7/12/2024 2059 by Corey Willard RN  Outcome: Progressing     Problem: Chronic Conditions and Co-morbidities  Goal: Patient's chronic conditions and co-morbidity symptoms are monitored and maintained or improved  7/13/2024 0938 by Barbie Wolfe RN  Outcome: Progressing     Problem: Safety - Adult  Goal: Free from fall injury  7/13/2024 0938 by Barbie Wolfe RN  Outcome: Progressing     Problem: Pain  Goal: Verbalizes/displays adequate comfort level or baseline comfort level  7/13/2024 0938 by Barbie Wolfe RN  Outcome: Progressing     
  Problem: Skin/Tissue Integrity  Goal: Absence of new skin breakdown  Description: 1.  Monitor for areas of redness and/or skin breakdown  2.  Assess vascular access sites hourly  3.  Every 4-6 hours minimum:  Change oxygen saturation probe site  4.  Every 4-6 hours:  If on nasal continuous positive airway pressure, respiratory therapy assess nares and determine need for appliance change or resting period.  7/13/2024 2245 by Corey Willard RN  Outcome: Progressing  7/13/2024 0938 by Barbie Wolfe RN  Outcome: Progressing     Problem: Chronic Conditions and Co-morbidities  Goal: Patient's chronic conditions and co-morbidity symptoms are monitored and maintained or improved  7/13/2024 2245 by Corey Willard RN  Outcome: Progressing  7/13/2024 0938 by Barbie Wolfe RN  Outcome: Progressing     Problem: Safety - Adult  Goal: Free from fall injury  7/13/2024 2245 by Corey Willard RN  Outcome: Progressing  7/13/2024 0938 by Barbie Wolfe RN  Outcome: Progressing     Problem: Discharge Planning  Goal: Discharge to home or other facility with appropriate resources  Outcome: Progressing     Problem: Pain  Goal: Verbalizes/displays adequate comfort level or baseline comfort level  7/13/2024 2245 by Corey Willard RN  Outcome: Progressing  7/13/2024 0938 by Barbie Wolfe RN  Outcome: Progressing     
  Problem: Skin/Tissue Integrity  Goal: Absence of new skin breakdown  Description: 1.  Monitor for areas of redness and/or skin breakdown  2.  Assess vascular access sites hourly  3.  Every 4-6 hours minimum:  Change oxygen saturation probe site  4.  Every 4-6 hours:  If on nasal continuous positive airway pressure, respiratory therapy assess nares and determine need for appliance change or resting period.  7/15/2024 1040 by Charlene Hammond RN  Outcome: Progressing  7/15/2024 0148 by Lindy Olivas RN  Outcome: Progressing     Problem: Chronic Conditions and Co-morbidities  Goal: Patient's chronic conditions and co-morbidity symptoms are monitored and maintained or improved  7/15/2024 1040 by Charlene Hammond RN  Outcome: Progressing  7/15/2024 0148 by Lindy Olivas RN  Outcome: Progressing     Problem: Safety - Adult  Goal: Free from fall injury  7/15/2024 1040 by Charlene Hammond RN  Outcome: Progressing  7/15/2024 0148 by Lindy Olivas RN  Outcome: Progressing     Problem: Discharge Planning  Goal: Discharge to home or other facility with appropriate resources  7/15/2024 1040 by Charlene Hammond RN  Outcome: Progressing  7/15/2024 0148 by Lindy Olivas RN  Outcome: Progressing  Flowsheets (Taken 7/14/2024 2100)  Discharge to home or other facility with appropriate resources: Identify barriers to discharge with patient and caregiver     
  Problem: Skin/Tissue Integrity  Goal: Absence of new skin breakdown  Description: 1.  Monitor for areas of redness and/or skin breakdown  2.  Assess vascular access sites hourly  3.  Every 4-6 hours minimum:  Change oxygen saturation probe site  4.  Every 4-6 hours:  If on nasal continuous positive airway pressure, respiratory therapy assess nares and determine need for appliance change or resting period.  Outcome: Progressing     Problem: Chronic Conditions and Co-morbidities  Goal: Patient's chronic conditions and co-morbidity symptoms are monitored and maintained or improved  Outcome: Progressing     Problem: Safety - Adult  Goal: Free from fall injury  Outcome: Progressing     Problem: Discharge Planning  Goal: Discharge to home or other facility with appropriate resources  Outcome: Progressing     Problem: Pain  Goal: Verbalizes/displays adequate comfort level or baseline comfort level  Outcome: Progressing     
  Problem: Skin/Tissue Integrity  Goal: Absence of new skin breakdown  Description: 1.  Monitor for areas of redness and/or skin breakdown  2.  Assess vascular access sites hourly  3.  Every 4-6 hours minimum:  Change oxygen saturation probe site  4.  Every 4-6 hours:  If on nasal continuous positive airway pressure, respiratory therapy assess nares and determine need for appliance change or resting period.  Outcome: Progressing     Problem: Safety - Adult  Goal: Free from fall injury  Outcome: Progressing     Problem: Pain  Goal: Verbalizes/displays adequate comfort level or baseline comfort level  Outcome: Progressing     
Gastrointestinal - Adult  Goal: Minimal or absence of nausea and vomiting  Outcome: Progressing     Problem: Infection - Adult  Goal: Absence of infection at discharge  Outcome: Progressing  Flowsheets (Taken 7/14/2024 2100)  Absence of infection at discharge:   Assess and monitor for signs and symptoms of infection   Monitor lab/diagnostic results     Problem: Metabolic/Fluid and Electrolytes - Adult  Goal: Electrolytes maintained within normal limits  Outcome: Progressing  Flowsheets (Taken 7/14/2024 2100)  Electrolytes maintained within normal limits: Monitor labs and assess patient for signs and symptoms of electrolyte imbalances     
intact  7/15/2024 1543 by Charlene Hammond RN  Outcome: Completed  7/15/2024 1040 by Charlene Hammond RN  Outcome: Progressing  7/15/2024 0148 by Lindy Olivas RN  Outcome: Progressing  Flowsheets (Taken 7/14/2024 2100)  Skin Integrity Remains Intact: Monitor for areas of redness and/or skin breakdown  Goal: Oral mucous membranes remain intact  7/15/2024 1543 by Charlene Hammond RN  Outcome: Completed  7/15/2024 1040 by Charlene Hammond RN  Outcome: Progressing     Problem: Musculoskeletal - Adult  Goal: Return mobility to safest level of function  7/15/2024 1543 by Charlene Hammond RN  Outcome: Completed  7/15/2024 1040 by Charlene Hammond RN  Outcome: Progressing  Flowsheets (Taken 7/14/2024 2100 by Lindy Olivas RN)  Return Mobility to Safest Level of Function: Assess patient stability and activity tolerance for standing, transferring and ambulating with or without assistive devices  Goal: Maintain proper alignment of affected body part  7/15/2024 1543 by Charlene Hammond RN  Outcome: Completed  7/15/2024 1040 by Charlene Hammond RN  Outcome: Progressing  Flowsheets (Taken 7/14/2024 2100 by Lindy Olivas RN)  Maintain proper alignment of affected body part: Support and protect limb and body alignment per provider's orders  Goal: Return ADL status to a safe level of function  7/15/2024 1543 by Charlene Hammond RN  Outcome: Completed  7/15/2024 1040 by Charlene Hammond RN  Outcome: Progressing  Flowsheets (Taken 7/14/2024 2100 by Lindy Olivas RN)  Return ADL Status to a Safe Level of Function:   Administer medication as ordered   Assess activities of daily living deficits and provide assistive devices as needed   Obtain physical therapy/occupational therapy consults as needed   Assist and instruct patient to increase activity and self care as tolerated     Problem: Gastrointestinal - Adult  Goal: Minimal or absence of nausea and vomiting  7/15/2024 1543 by Charlene Hammond RN  Outcome: 
membranes remain intact  Outcome: Progressing     Problem: Musculoskeletal - Adult  Goal: Return mobility to safest level of function  Outcome: Progressing  Goal: Maintain proper alignment of affected body part  Outcome: Progressing  Goal: Return ADL status to a safe level of function  Outcome: Progressing     Problem: Gastrointestinal - Adult  Goal: Minimal or absence of nausea and vomiting  Outcome: Progressing  Goal: Maintains or returns to baseline bowel function  Outcome: Progressing  Goal: Maintains adequate nutritional intake  Outcome: Progressing     Problem: Genitourinary - Adult  Goal: Absence of urinary retention  Outcome: Progressing     Problem: Infection - Adult  Goal: Absence of infection at discharge  Outcome: Progressing     Problem: Metabolic/Fluid and Electrolytes - Adult  Goal: Electrolytes maintained within normal limits  Outcome: Progressing  Goal: Hemodynamic stability and optimal renal function maintained  Outcome: Progressing     Problem: Hematologic - Adult  Goal: Maintains hematologic stability  Outcome: Progressing

## 2024-07-15 NOTE — FLOWSHEET NOTE
07/15/24 0830   Vital Signs   Temp 98.1 °F (36.7 °C)   Temp Source Oral   Pulse 72   Heart Rate Source Monitor   Respirations 16   BP 94/60   MAP (Calculated) 71   BP Location Left upper arm   BP Method Automatic   Patient Position High fowlers   Pain Assessment   Pain Assessment None - Denies Pain   Oxygen Therapy   SpO2 98 %   O2 Device Nasal cannula   O2 Flow Rate (L/min) 3 L/min     AM assessment completed. Pt alert and oriented x4.  No signs of symptoms of distress noted. Patient tolerated medications well. Respirations easy and even. Bed in lowest position, bed alarm in place and functioning properly, SR up x 2 and bed in low position. Call light within reach.     Bedside Mobility Assessment Tool (BMAT):     Assessment Level 1- Sit and Shake    1. From a semi-reclined position, ask patient to sit up and rotate to a seated position at the side of the bed. Can use the bedrail.    2. Ask patient to reach out and grab your hand and shake making sure patient reaches across his/her midline.   Fail- Patient is unable to perform tasks, patient is MOBILITY LEVEL 1.    Assessment Level 2- Stretch and Point   1. With patient in seated position at the side of the bed, have patient place both feet on the floor (or stool) with knees no higher than hips.    2. Ask patient to stretch one leg and straighten the knee, then bend the ankle/flex and point the toes. If appropriate, repeat with the other leg.   Fail- Patient is unable to complete task. Patient is MOBILITY LEVEL 2.     Assessment Level 3- Stand   1. Ask patient to elevate off the bed or chair (seated to standing) using an assistive device (cane, bedrail).    2. Patient should be able to raise buttocks off be and hold for a count of five. May repeat once.   Fail- Patient unable to demonstrate standing stability. Patient is MOBILITY LEVEL 3.     Assessment Level 4- Walk   1. Ask patient to march in place at bedside.    2. Then ask patient to advance step and return

## 2024-07-15 NOTE — PROGRESS NOTES
Department of Orthopedic Surgery  Physician Assistant   Progress Note    Subjective:     Hospital day 3 right knee pain difficulty walking   Systemic or Specific Complaints:patient is in bed at time of visit no family at bedside. She is noting improvement in her knee pain and mobilizing the knee a little better today she notes that she still has pain in the knee and had difficulty with therapy yesterday. There is discussion about ARU   And there is no brace in the room she was not placed in an immobilizer for ambulation.     Objective:     Patient Vitals for the past 24 hrs:   BP Temp Temp src Pulse Resp SpO2   07/12/24 0723 (!) 126/55 97.5 °F (36.4 °C) Oral 72 17 97 %   07/12/24 0155 (!) 143/57 97.9 °F (36.6 °C) Oral 78 18 98 %   07/11/24 2201 -- -- -- -- 16 --   07/11/24 2115 (!) 94/55 98.4 °F (36.9 °C) Oral 85 17 94 %   07/11/24 1929 (!) 92/57 97.6 °F (36.4 °C) Oral 89 17 94 %   07/11/24 1345 94/80 98 °F (36.7 °C) Oral 81 16 --       General: alert, appears stated age, and cooperative   Wound: No Erythema, No Edema, and No Drainage   Motion: Painful range of Motion in affected extremity motion improved 0-75    DVT Exam: No evidence of DVT seen on physical exam.  Negative Ifeoma's sign.  No cords or calf tenderness.  No significant calf/ankle edema.     Additional exam:     Data Review  CBC:   Lab Results   Component Value Date/Time    WBC 6.2 07/11/2024 05:49 AM    RBC 3.51 07/11/2024 05:49 AM    HGB 11.4 07/11/2024 05:49 AM    HCT 33.6 07/11/2024 05:49 AM     07/11/2024 05:49 AM           Assessment:      right knee pain aggravation of right knee OA with fall.     Plan:      1:  reordered immobilizer fur use while standing or walking remove when sitting or laying   2:  Continue Deep venous thrombosis prophylaxis per medicine team   3:  Continue Pain Control  4: continue with PT/OT WBAT with walker and immobilizer as needed for bucking or give way remove for ROM when sitting or laying.   5: ok per ortho 
 went to check pts blood pressure her reading was low on the automatic machine so I manually did her BP . on her left arm it was 82/60 on her right arm it was 110/64     MD made aware. MD states unless she is having chest pain, just continue to monitor BP's   
4 Eyes Skin Assessment     NAME:  Nya Sanchez  YOB: 1945  MEDICAL RECORD NUMBER:  [id number]    The patient is being assessed for  Admission    I agree that at least one RN has performed a thorough Head to Toe Skin Assessment on the patient. ALL assessment sites listed below have been assessed.      Areas assessed by both nurses:    Head, Face, Ears, Shoulders, Back, Chest, Arms, Elbows, Hands, Sacrum. Buttock, Coccyx, Ischium, and Legs. Feet and Heels        Does the Patient have a Wound? No noted wound(s)       Bernabe Prevention initiated by RN: No  Wound Care Orders initiated by RN: No    Pressure Injury (Stage 3,4, Unstageable, DTI, NWPT, and Complex wounds) if present, place Wound referral order by RN under : No    New Ostomies, if present place, Ostomy referral order under : No     Nurse 1 eSignature: Electronically signed by Arleen Webb RN on 7/11/24 at 12:34 AM EDT  Dry, red, scaly patches noted to right shin and right upper back, patient states she has psoriasis. Reddened areas under breast and abdominal folds. Raised area to right wrist patient states it is not painful and has been there for a while, swollen lymph nodes to left jennifer of neck patient states it is her norm.    **SHARE this note so that the co-signing nurse can place an eSignature**    Nurse 2 eSignature: Electronically signed by Jaylyn Friedman RN on 7/11/24 at 12:39 AM EDT   
4 Eyes Skin Assessment     NAME:  Nya Sanchez  YOB: 1945  MEDICAL RECORD NUMBER:  [id number]    The patient is being assessed for  Admission    I agree that at least one RN has performed a thorough Head to Toe Skin Assessment on the patient. ALL assessment sites listed below have been assessed.      Areas assessed by both nurses:    Head, Face, Ears, Shoulders, Back, Chest, Arms, Elbows, Hands, Sacrum. Buttock, Coccyx, Ischium, Legs. Feet and Heels, and Under Medical Devices   Excoriation noted under Bilateral breast and Abdomen folds     Does the Patient have a Wound? No noted wound(s)       Bernabe Prevention initiated by RN: Yes  Wound Care Orders initiated by RN: No    Pressure Injury (Stage 3,4, Unstageable, DTI, NWPT, and Complex wounds) if present, place Wound referral order by RN under : No    New Ostomies, if present place, Ostomy referral order under : No     Nurse 1 eSignature: Electronically signed by Asiya Ernst RN on 7/10/24 at 6:39 PM EDT    **SHARE this note so that the co-signing nurse can place an eSignature**    Nurse 2 eSignature: Electronically signed by Jaylyn Friedman RN on 7/11/24 at 12:39 AM EDT    
Bedside Mobility Assessment Tool (BMAT):     Assessment Level 1- Sit and Shake    1. From a semi-reclined position, ask patient to sit up and rotate to a seated position at the side of the bed. Can use the bedrail.    2. Ask patient to reach out and grab your hand and shake making sure patient reaches across his/her midline.   Fail- Patient is unable to perform tasks, patient is MOBILITY LEVEL 1.    Assessment Level 2- Stretch and Point   1. With patient in seated position at the side of the bed, have patient place both feet on the floor (or stool) with knees no higher than hips.    2. Ask patient to stretch one leg and straighten the knee, then bend the ankle/flex and point the toes. If appropriate, repeat with the other leg.   Fail- Patient is unable to complete task. Patient is MOBILITY LEVEL 2.     Assessment Level 3- Stand   1. Ask patient to elevate off the bed or chair (seated to standing) using an assistive device (cane, bedrail).    2. Patient should be able to raise buttocks off be and hold for a count of five. May repeat once.   Fail- Patient unable to demonstrate standing stability. Patient is MOBILITY LEVEL 3.     Assessment Level 4- Walk   1. Ask patient to march in place at bedside.    2. Then ask patient to advance step and return each foot. Some medical conditions may render a patient from stepping backwards, use your best clinical judgement.   Fail- Patient not able to complete tasks OR requires use of assistive device. Patient is MOBILITY LEVEL 3.       Mobility Level- 3   
Consult has been called to Dr. rinaldi on 7/10/24. Spoke with dr rinaldi via perfect serve. 6:26 PM    Shiloh Hung  7/10/2024  
Handoff report and transfer of care given at bedside to Charlene RN.   Patient in stable condition, denies needs/concerns at this time.  Call light within reach.     
Home med list completed Dr. WAGGONER notified via perfect serve. Report given @ bedside to Edwina Schaeffer RN, for transferral of care. Pt resting in bed. Call light in reach.     
Inpatient Occupational Therapy Evaluation and Treatment    Unit: [facility]  Date:  7/11/2024  Patient Name:    Nya Sanchez  Admitting diagnosis:  Physical deconditioning [R53.81]  Unable to ambulate [R26.2]  Acute cystitis without hematuria [N30.00]  Fall, initial encounter [W19.XXXA]  Admit Date:  7/9/2024  Precautions/Restrictions/WB Status/ Lines/ Wounds/ Oxygen: Fall risk, Bed/chair alarm, and Lines (IV, Supplemental O2 (4L), and external catheter)    Treatment Time:  5571-8281  Treatment Number:  1  Timed Code Treatment Minutes: 30 minutes  Total Treatment Minutes:  40  minutes    Patient Goals for Therapy: \"to get home \"          Discharge Recommendations: SNF  DME needs for discharge: Defer to facility       Therapy recommendations for staff:   Assist of 1 for sitting EOB    History of Present Illness:   Per H&P of Dr Mary Ellen Naranjo 7/9/2024  \"Chief complaint: Physical deconditioning  Nya Sanchez is a 79 y.o. female with pmh of RA, HLD, Mood disorder who presents with fall. She states that fall happened about 2 days ago when she was walking in her kitchen she tripped over something on the floor and fell did not loose consciousness. She was not able to move after that her  and grandson helped her get up since then she has not been able to walk at all so family brought her to ED Denied any fevers, chills, CP, SOB, cough, N/V, abdominal pain, C/D or urinary changes. She c/o back pain which is not worse than her baseline but increased pain in R knee unable to move b/l LE without pain\"    AM-PAC Score: AM-PAC Inpatient Daily Activity Raw Score: 16     Subjective:  Patient lying reclined in bed with no family present.   Pt agreeable to this OT session.     Cognition:    A&O x4   Able to follow 2 step commands    Pain:   Yes  Location: R. knee  Rating: 10 /10  Pain Medicine Status: Received pain med prior to tx and No request made    Activity Tolerance:   Pt completed therapy session with pain     
Inpatient Occupational Therapy Treatment    Unit: [address] [facility]  Date:  7/13/2024  Patient Name:    Nya Sanchez  Admitting diagnosis:  Physical deconditioning [R53.81]  Unable to ambulate [R26.2]  Acute cystitis without hematuria [N30.00]  Fall, initial encounter [W19.XXXA]  Admit Date:  7/9/2024  Precautions/Restrictions/WB Status/ Lines/ Wounds/ Oxygen: Fall risk, Bed/chair alarm, and Lines (IV, Supplemental O2 (3L), and external catheter)    Treatment Time: 08:45-09:46  Treatment Number:  3  Timed Code Treatment Minutes: 38 minutes (units split with PT due to patient's observation status)  Total Treatment Minutes: 61 minutes    Patient Goals for Therapy: \"I need to use the bathroom\"          Discharge Recommendations: Acute Rehab (ARU)/ Inpatient Rehab Facility (IRF)  DME needs for discharge: Defer to facility       Therapy recommendations for staff:   Assist of 2 for stand-pivot transfers with JULIO STEDY and gait belt to/from BSC  to/from chair    History of Present Illness:   Per H&P of Dr Mary Ellen Naranjo 7/9/2024  \"Chief complaint: Physical deconditioning  Nya Sanchez is a 79 y.o. female with pmh of RA, HLD, Mood disorder who presents with fall. She states that fall happened about 2 days ago when she was walking in her kitchen she tripped over something on the floor and fell did not loose consciousness. She was not able to move after that her  and grandson helped her get up since then she has not been able to walk at all so family brought her to ED Denied any fevers, chills, CP, SOB, cough, N/V, abdominal pain, C/D or urinary changes. She c/o back pain which is not worse than her baseline but increased pain in R knee unable to move b/l LE without pain\"    Per PRISCILA Cordova: Right knee immobilizer for use while standing or walking and remove when sitting or laying    AM-PAC Score: AM-PAC Inpatient Daily Activity Raw Score: 16     Subjective:  Patient lying reclined in bed with no family 
Inpatient Occupational Therapy Treatment    Unit: [address] [place]  Date:  2024  Patient Name:    Nya Sanchez  Admitting diagnosis:  Physical deconditioning [R53.81]  Unable to ambulate [R26.2]  Acute cystitis without hematuria [N30.00]  Fall, initial encounter [W19.XXXA]  Admit Date:  2024  Precautions/Restrictions/WB Status/ Lines/ Wounds/ Oxygen: Fall risk, Bed/chair alarm, and Lines (IV, Supplemental O2 (3L), and external catheter)    Treatment Time: 15:45-16:44  Treatment Number:  2  Timed Code Treatment Minutes: 29 minutes  Total Treatment Minutes: 59 minutes    Patient Goals for Therapy: \"I want to stand\"          Discharge Recommendations: SNF  DME needs for discharge: Defer to facility       Therapy recommendations for staff:   Assist of 2 for stand-pivot transfers with JULIO PULLIAM and gait belt to/from BSC  to/from chair    History of Present Illness:   Per H&P of Dr Mary Ellen Naranjo 2024  \"Chief complaint: Physical deconditioning  Nya Sanchez is a 79 y.o. female with pmh of RA, HLD, Mood disorder who presents with fall. She states that fall happened about 2 days ago when she was walking in her kitchen she tripped over something on the floor and fell did not loose consciousness. She was not able to move after that her  and grandson helped her get up since then she has not been able to walk at all so family brought her to ED Denied any fevers, chills, CP, SOB, cough, N/V, abdominal pain, C/D or urinary changes. She c/o back pain which is not worse than her baseline but increased pain in R knee unable to move b/l LE without pain\"    AM-PAC Score: AM-PAC Inpatient Daily Activity Raw Score: 16     Subjective:  Patient lying reclined in bed with no family present.   Pt agreeable to this OT session.     Cognition:    A&O orientation not directly assessed.    Able to follow 2 step commands    Pain:   Yes  Location: right knee and lower leg  Ratin /10  Pain Medicine Status: No request 
Inpatient Occupational Therapy Treatment    Unit: [address] [place]  Date:  7/15/2024  Patient Name:    Nya Sanchez  Admitting diagnosis:  Physical deconditioning [R53.81]  Unable to ambulate [R26.2]  Acute cystitis without hematuria [N30.00]  Fall, initial encounter [W19.XXXA]  Admit Date:  7/9/2024  Precautions/Restrictions/WB Status/ Lines/ Wounds/ Oxygen: Fall risk, Bed/chair alarm, and Lines (IV, Supplemental O2 (3L), and external catheter), WBAT with walker and R knee immobilizer as needed for bucking or give way, remove for ROM when sitting or laying     Treatment Time: 11:45-12:50  Treatment Number:  4  Timed Code Treatment Minutes: 65 minutes   Total Treatment Minutes: 65 minutes    Patient Goals for Therapy: \"I want to get out of bed\"          Discharge Recommendations: Acute Rehab (ARU)/ Inpatient Rehab Facility (IRF)  DME needs for discharge: Defer to facility       Therapy recommendations for staff:   Assist of 2 for stand-pivot transfers with JULIO PULLIAM and gait belt to/from BSC  to/from chair    History of Present Illness:   Per H&P of Dr Mary Ellen Naranjo 7/9/2024  \"Chief complaint: Physical deconditioning  Nya Sanchez is a 79 y.o. female with pmh of RA, HLD, Mood disorder who presents with fall. She states that fall happened about 2 days ago when she was walking in her kitchen she tripped over something on the floor and fell did not loose consciousness. She was not able to move after that her  and grandson helped her get up since then she has not been able to walk at all so family brought her to ED Denied any fevers, chills, CP, SOB, cough, N/V, abdominal pain, C/D or urinary changes. She c/o back pain which is not worse than her baseline but increased pain in R knee unable to move b/l LE without pain\"    Per PRISCILA Cordova: Right knee immobilizer for use while standing or walking and remove when sitting or laying    AM-PAC Score: AM-PAC Inpatient Daily Activity Raw Score: 16 
Inpatient Physical Therapy Treatment    Unit: [address] [facility]  Date:  7/13/2024  Patient Name:    Nya Sanchez  Admitting diagnosis:  Physical deconditioning [R53.81]  Unable to ambulate [R26.2]  Acute cystitis without hematuria [N30.00]  Fall, initial encounter [W19.XXXA]  Admit Date:  7/9/2024  Precautions/Restrictions/WB Status/ Lines/ Wounds/ Oxygen: Fall risk, Bed/chair alarm, and Lines (IV, Supplemental O2 (3L), and external catheter) ; WBAT with walker and R knee immobilizer as needed for bucking or give way, remove for ROM when sitting or laying      Treatment Time:  0845 - 0946  Treatment Number:  3   Timed Code Treatment Minutes: 23 minutes (minutes split with OT)  Total Treatment Minutes: 61 minutes    Patient Stated Goals for Therapy: \" I would like to try to stand. \"          Discharge Recommendations: ARU/IRF (inpatient rehab facility)   DME needs for discharge: Defer to facility       Therapy recommendation for EMS Transport: requires transport by cot due to pt needs lift equipment for safe transfers and pt needs A x 2 for safe transfers    Therapy recommendations for staff:   Assist of 2 for stand-pivot transfers with JULIO STEDY and gait belt to/from chair    History of Present Illness:   Per H&P of Dr Mary Ellen Naranjo 7/9/2024  \"Chief complaint: Physical deconditioning  Nya Sanchez is a 79 y.o. female with pmh of RA, HLD, Mood disorder who presents with fall. She states that fall happened about 2 days ago when she was walking in her kitchen she tripped over something on the floor and fell did not loose consciousness. She was not able to move after that her  and grandson helped her get up since then she has not been able to walk at all so family brought her to ED Denied any fevers, chills, CP, SOB, cough, N/V, abdominal pain, C/D or urinary changes. She c/o back pain which is not worse than her baseline but increased pain in R knee unable to move b/l LE without pain\"    CT Lumbar Spine:  1. 
Inpatient Physical Therapy Treatment    Unit: [facility]  Date:  7/12/2024  Patient Name:    Nya Sanchez  Admitting diagnosis:  Physical deconditioning [R53.81]  Unable to ambulate [R26.2]  Acute cystitis without hematuria [N30.00]  Fall, initial encounter [W19.XXXA]  Admit Date:  7/9/2024  Precautions/Restrictions/WB Status/ Lines/ Wounds/ Oxygen: Fall risk, Bed/chair alarm, and Lines (IV, Supplemental O2 (4L), and external catheter) ; WBAT with walker and R knee immobilizer as needed for bucking or give way, remove for ROM when sitting or laying      Treatment Time:  1545-1644  Treatment Number:  2   Timed Code Treatment Minutes: 29 minutes  Total Treatment Minutes:  59 minutes    Patient Stated Goals for Therapy: \" I would like to try to stand. \"          Discharge Recommendations: ARU/IRF (inpatient rehab facility)   DME needs for discharge: Defer to facility       Therapy recommendation for EMS Transport: requires transport by cot due to pt needs lift equipment for safe transfers and pt needs A x 2 for safe transfers    Therapy recommendations for staff:   Assist of 2 for stand-pivot transfers with JULIO STEDY and gait belt to/from chair    History of Present Illness:   Per H&P of Dr Mary Ellen Naranjo 7/9/2024  \"Chief complaint: Physical deconditioning  Nya Sanchez is a 79 y.o. female with pmh of RA, HLD, Mood disorder who presents with fall. She states that fall happened about 2 days ago when she was walking in her kitchen she tripped over something on the floor and fell did not loose consciousness. She was not able to move after that her  and grandson helped her get up since then she has not been able to walk at all so family brought her to ED Denied any fevers, chills, CP, SOB, cough, N/V, abdominal pain, C/D or urinary changes. She c/o back pain which is not worse than her baseline but increased pain in R knee unable to move b/l LE without pain\"    CT Lumbar Spine:  1. No acute intracranial 
PRN [place] po given for patient c/o back pain, rating 8/10, vss. Continue to monitor for pain progress.  
Patient c/o Rt knee pain PRN [place] given. Patient repositioned with pillow support for comfort. No other needs noted at this time. Bed alarm in place. Call light and bedside table within reach.   
Patient provided a COPD Educational Folder that includes the following materials:     [x]  [employer] Booklet: Managing your COPD  [x]  ALA: Getting the Most Out of Medication Delivery Devices  [x]  ALA: My COPD Action Plan  [x]  [employer]: [facility]   [x]  Smoking Cessation Classes  [x]  Outpatient Spiritual Care Services  [x]  Magnet: Signs of COPD    PATIENT/CAREGIVER TEACHING:   Level of patient/caregiver understanding able to:   [x] Verbalize understanding   [] Demonstrate understanding       [] Teach back        [] Needs reinforcement     []  Other:     Electronically signed by Anuradha Lara RN on 7/14/2024 at 9:31 AM    
Patient with new Malodorous smelling urine output this shift. Urine is cloudy. [place]atha-area cleaned with bath wipes, urine sample collected. New pure-wick applied again, along w/ clean depends. Perfect Serve sent to MD. Orders placed for UA and sample sent to lab. Patient repositioned for comfort w/ pillow support. Bed alarm in place. Call light and bedside table within reach.   
Progress Note    Admit Date:  7/9/2024    Mechanical fall   PT/OT   Right knee pain with effusion   Unable to bear weight     Subjective:  Ms. Sanchez today is still having right knee pain with movement.    Objective:   Patient Vitals for the past 4 hrs:   BP Temp Temp src Pulse Resp SpO2   07/12/24 0723 (!) 126/55 97.5 °F (36.4 °C) Oral 72 17 97 %            Intake/Output Summary (Last 24 hours) at 7/12/2024 1013  Last data filed at 7/12/2024 0543  Gross per 24 hour   Intake 240 ml   Output 450 ml   Net -210 ml       Physical Exam:    Gen: No distress. Alert. +Pleasant elderly female   Eyes: PERRL. No sclera icterus. No conjunctival injection.   Neck: No JVD.  Trachea midline.+large thyroid goiter   Resp: No accessory muscle use. No crackles. No wheezes. No rhonchi.   CV: Regular rate. Regular rhythm. + murmur.  No rub. No edema.   Peripheral Pulses: +2 palpable, equal bilaterally   GI: Non-tender. Non-distended.  Normal bowel sounds.  Skin: Warm and dry. No nodule on exposed extremities. No rash on exposed extremities.   M/S: No cyanosis. No joint deformity. No clubbing. +right knee with swelling and tenderness to palpation, does have some ecchymosis of right lower extremity   Neuro: Awake. Grossly nonfocal    Psych: Oriented x 3. No anxiety or agitation.         Medications:  folic acid, 1 mg, Daily  predniSONE, 40 mg, Daily  [START ON 7/15/2024] methotrexate, 15 mg, Weekly  atorvastatin, 10 mg, Daily  sertraline, 100 mg, Daily  sodium chloride flush, 5-40 mL, 2 times per day  enoxaparin, 40 mg, Daily      PRN Medications:  HYDROcodone-acetaminophen, 1 tablet, Q6H PRN  acetaminophen, 650 mg, Q4H PRN  albuterol sulfate HFA, 2 puff, Q4H PRN  diazePAM, 5 mg, Q8H PRN  sodium chloride flush, 5-40 mL, PRN  sodium chloride, , PRN  potassium chloride, 40 mEq, PRN   Or  potassium alternative oral replacement, 40 mEq, PRN   Or  potassium chloride, 10 mEq, PRN  magnesium sulfate, 2,000 mg, PRN  ondansetron, 4 mg, Q8H 
Progress Note    Admit Date:  7/9/2024    Mechanical fall  PT/OT    Subjective:  Ms. Sanchez today is seen resting in bed. Is still having some pain in her lower extremities, Mostly in the right knee.     Objective:   Patient Vitals for the past 4 hrs:   BP Pulse Resp SpO2   07/10/24 0802 (!) 156/73 92 16 97 %          Intake/Output Summary (Last 24 hours) at 7/10/2024 1143  Last data filed at 7/9/2024 2250  Gross per 24 hour   Intake 0 ml   Output --   Net 0 ml       Physical Exam:    Gen: No distress. Alert. Pleasant elderly female   Eyes: PERRL. No sclera icterus. No conjunctival injection.   ENT: No discharge. Pharynx clear. ++very large goiter on thyroid   Neck: No JVD.  Trachea midline.  Resp: No accessory muscle use. No crackles. No wheezes. No rhonchi.   CV: Regular rate. Regular rhythm. + murmur.  No rub. No edema.   Peripheral Pulses: +2 palpable, equal bilaterally   GI: Non-tender. Non-distended.  Normal bowel sounds.  Skin: Warm and dry. No nodule on exposed extremities. No rash on exposed extremities.   M/S: No cyanosis. No joint deformity. No clubbing. Exam limited as patient would NOT roll over to let me fully exam her RLE ++full active ROM of left lower extremity, limited active ROM of right lower extremity 2/2 to pain, +right knee swelling and tenderness to palpation of knee  No back pain or point tenderness on exam   Neuro: Awake. Grossly nonfocal    Psych: Oriented x 3. No anxiety or agitation.         Medications:  cefTRIAXone (ROCEPHIN) IV, 1,000 mg, Q24H  atorvastatin, 10 mg, Daily  sertraline, 100 mg, Daily  sodium chloride flush, 5-40 mL, 2 times per day  enoxaparin, 40 mg, Daily      PRN Medications:  acetaminophen, 650 mg, Q4H PRN  albuterol sulfate HFA, 2 puff, Q4H PRN  diazePAM, 5 mg, Q8H PRN  sodium chloride flush, 5-40 mL, PRN  sodium chloride, , PRN  potassium chloride, 40 mEq, PRN   Or  potassium alternative oral replacement, 40 mEq, PRN   Or  potassium chloride, 10 mEq, 
Pt leaving via EMS transport to [facility] . Report called see previous note. MIKAELA JON RN    
Pt refused CT scan of abd/pelvis and CT scan of knee.    
Pt refused Methotrexate after scanning in and opening pills. Pt stated they are red and her pills at home are yellow. The red will make her sick. Meds wasted disposed in med room in container [facility] witnessed.   
Pt states she is in pain but is refusing pain medication. I offered Ice as well and she refused that. She did let me reposition her in the bed.     Pt also refused some of her morning medications. Pt educated on the benefits of the medications and the risks if medication is not taken. Pt voiced understanding but still refused.   
RT Inhaler-Nebulizer Bronchodilator Protocol Note    There is a bronchodilator order in the chart from a provider indicating to follow the RT Bronchodilator Protocol and there is an “Initiate RT Inhaler-Nebulizer Bronchodilator Protocol” order as well (see protocol at bottom of note).    CXR Findings:  No results found.    The findings from the last RT Protocol Assessment were as follows:   History Pulmonary Disease: Chronic pulmonary disease  Respiratory Pattern: Regular pattern and RR 12-20 bpm  Breath Sounds: Clear breath sounds  Cough: Strong, spontaneous, non-productive  Indication for Bronchodilator Therapy: None, Decreased or absent breath sounds  Bronchodilator Assessment Score: 2    Aerosolized bronchodilator medication orders have been revised according to the RT Inhaler-Nebulizer Bronchodilator Protocol below.    Respiratory Therapist to perform RT Therapy Protocol Assessment initially then follow the protocol.  Repeat RT Therapy Protocol Assessment PRN for score 0-3 or on second treatment, BID, and PRN for scores above 3.    No Indications - adjust the frequency to every 6 hours PRN wheezing or bronchospasm, if no treatments needed after 48 hours then discontinue using Per Protocol order mode.     If indication present, adjust the RT bronchodilator orders based on the Bronchodilator Assessment Score as indicated below.  Use Inhaler orders unless patient has one or more of the following: on home nebulizer, not able to hold breath for 10 seconds, is not alert and oriented, cannot activate and use MDI correctly, or respiratory rate 25 breaths per minute or more, then use the equivalent nebulizer order(s) with same Frequency and PRN reasons based on the score.  If a patient is on this medication at home then do not decrease Frequency below that used at home.    0-3 - enter or revise RT bronchodilator order(s) to equivalent RT Bronchodilator order with Frequency of every 4 hours PRN for wheezing or increased 
Report called to Stefania at [facility] .   Pt IV D/C per protocol. Pt belongings packed up.    
    Electronically signed by Kayden Mcdonnell PA-C on 7/13/2024 at 8:27 AM   
PRN   Or  ondansetron, 4 mg, Q6H PRN  polyethylene glycol, 17 g, Daily PRN  acetaminophen, 650 mg, Q6H PRN          Data:  CBC:   Recent Labs     07/11/24  0549   WBC 6.2   HGB 11.4*   HCT 33.6*   MCV 95.6          BMP:   Recent Labs     07/11/24  0549      K 4.2   CL 98*   CO2 31   BUN 10   CREATININE <0.5*       LIVER PROFILE:   No results for input(s): \"AST\", \"ALT\", \"LIPASE\", \"AMYLASE\", \"BILIDIR\", \"BILITOT\", \"ALKPHOS\" in the last 72 hours.    Invalid input(s): \"ALB\"    PT/INR: No results for input(s): \"PROTIME\", \"INR\" in the last 72 hours.    CULTURES  Results       Procedure Component Value Units Date/Time    Culture, Urine [[id number]]  (Abnormal) Collected: 07/09/24 1735    Order Status: Completed Specimen: Urine, clean catch Updated: 07/10/24 1436     Organism Strep agalactiae (Beta Strep Group B)     Urine Culture, Routine --     50,000 CFU/ml  No further workup  Susceptibility testing of penicillin and other beta lactams is  not necessary for beta hemolytic Streptococci since resistant  strains have not been identified. (CLSI M100)      Narrative:      ORDER#: [id number]                          ORDERED BY: NADIA ALARCON  SOURCE: Urine Clean Catch                  COLLECTED:  07/09/24 17:35  ANTIBIOTICS AT [facility].:                      RECEIVED :  07/09/24 17:53               RADIOLOGY  CT LUMBAR SPINE WO CONTRAST   Final Result   1. No acute intracranial abnormality.   2. No acute osseous abnormality of the cervical spine.   3. Severe lumbar scoliotic deformity partly degenerative.   4. L1-L2 fusion and likely superior endplate compression of L1. Acuity is   indeterminate.   5. Moderate to severe multilevel degenerative disc disease and hypertrophic   changes.   6. Severe central stenosis L4-L5 as well as bilateral foraminal stenosis   worse on the right.         CT CERVICAL SPINE WO CONTRAST   Final Result   1. No acute intracranial abnormality.   2. No acute osseous abnormality of the cervical 
STEDY, pt able to stand up to the STEDY with Max A but did not want to transfer to the chair. Static stand at stedy and sitting on the stedy paddles for 20 seconds. Pain at 5-6/10 with STEDY.    Gait gait deferred due to pain in R knee and leg limiting ability to weight shift; pt ambulated 0 ft.   Distance:      0 ft  Deviations (firm surface/linoleum):  N/A  Assistive Device Used:    gait belt and rolling walker (RW)  Level of Assist:    Unable to ambulate with Max A x1 standing with RW    Comment: N/A    Stair Training deferred, pt unsafe/ not appropriate to complete stairs at this time; R leg too painful to attempt.  # of Steps:   N/A  Level of Assist:  Not Tested   UE Support:  NA  Assistive Device:  N/A  Pattern:   N/A  Comments: N/A     Therapeutic Exercises Initiated   PROM to R knee  Supine:  N/A    Seated:  PROM with gentle flexion and extension x 10 reps    Standing:  N/A      Positioning Needs   Pt in bed, alarm set, positioned in proper neutral alignment and pressure relief provided.   Call light provided and all needs within reach  RN aware of pt position/status    Other Activities  None.    Patient/Family Education   Pt educated on role of inpatient PT, POC, importance of continued activity, DC recommendations, safety awareness, transfer techniques, and calling for assist with mobility. Discussed SNF rec due to pt's stairs at home, requiring max A for standing, and unable to ambulate at this time. Pt in agreement that she will require additional therapy services once she d/c from the hospital if she is not at an independent level.      Assessment  Pt seen today for physical therapy Evaluation & Treatment. Pt demonstrated decreased Activity tolerance, Balance, ROM, Safety, and Strength as well as decreased independence with Ambulation, Bed Mobility , and Transfers.     Pt presents with significant R knee pain after her fall at home. Pt is limited in returning home due to stairs and her level of 
knee joint effusion with pain   #Scoliosis   -severe lumbar scoliotic deformity   -L1-L2 fusion and superior endplate compression L1 -indeterminate acuity   -severe central stenosis L4-L5 and bilateral foraminal stenosis worse on R--> had fusion done 20 years ago and denies any acute back pain   -PT/OT   -fall precautions  -case management consult for placement   -tramadol prn --> norco prn   -CK pending --> negative   -ortho consulted with right knee pain and difficulty ambulating --> recommending prednisone burst, no further imaging     #Acute cystitis  -urine culture pending--> strep B   -ok to stop abx      #Psoriatic arthritis   -on methotrexate weekly      #Normocytic anemia  -denies s/s of bleeding  -monitor CBC  -on folic acid   -iron studies, B12, folate pending --> WNLs      #Thyroid goiter   -patient reports this is chronic and aspiration was recommended to her but she absolutely does not want anything done about it  -last thyroid US was 2018  -TSH WNLs      #COPD without AE   #Chronic hypoxic respiratory failure   -wears 2.5 L prn with exertion   -continue prn inhalers      #Murmur  -outpatient echocardiogram     #HLD   -on statin      #Anxiety  #Depression   -on zoloft and valium prn     PT/OT eval   Needs SNF     DVT Prophylaxis: Lovenox  Diet: ADULT DIET; Regular  Code Status: Full Code        CUCA WESTBROOK MD  07/14/24  10:06 AM            
bleeding  -monitor CBC  -on folic acid   -iron studies, B12, folate pending --> WNLs      #Thyroid goiter   -patient reports this is chronic and aspiration was recommended to her but she absolutely does not want anything done about it  -last thyroid US was 2018  -TSH WNLs      #COPD without AE   #Chronic hypoxic respiratory failure   -wears 2.5 L prn with exertion   -continue prn inhalers      #Murmur  -outpatient echocardiogram     #HLD   -on statin      #Anxiety  #Depression   -on zoloft and valium prn     Awaiting PT/OT eval     DVT Prophylaxis: Lovenox  Diet: ADULT DIET; Regular  Code Status: Full Code    PRISCILA Aly  07/11/24  11:12 AM      JUSTEN Enciso.

## 2024-07-15 NOTE — DISCHARGE SUMMARY
Name:  Nya Sanchez  Room:  0229/0229-02  MRN:    [id number]    Discharge Summary      This discharge summary is in conjunction with a complete physical exam done on the day of discharge.    Attending Physician: Dr. Servin  Discharging Physician: Erin ROPERP-C      Admit: 7/9/2024  Discharge:  7/15/2024    HPI:  Nya Sanchez is a 79 y.o. female with pmh of RA, HLD, Mood disorder who presents with fall. She states that fall happened about 2 days ago when she was walking in her kitchen she tripped over something on the floor and fell did not loose consciousness. She was not able to move after that her  and grandson helped her get up since then she has not been able to walk at all so family brought her to ED Denied any fevers, chills, CP, SOB, cough, N/V, abdominal pain, C/D or urinary changes. She c/o back pain which is not worse than her baseline but increased pain in R knee unable to move b/l LE without pain      Diagnoses this Admission and Hospital Course   #Mechanical fall   #Right knee joint effusion with pain   #Scoliosis   -severe lumbar scoliotic deformity   -L1-L2 fusion and superior endplate compression L1 -indeterminate acuity   -severe central stenosis L4-L5 and bilateral foraminal stenosis worse on R--> had fusion done 20 years ago and denies any acute back pain   -PT/OT   -fall precautions  -case management consult for placement   -tramadol prn --> norco prn   -CK pending --> negative   -ortho consulted with right knee pain and difficulty ambulating --> recommending prednisone burst, no further imaging   D/c to SNF     #Acute cystitis  -urine culture pending--> strep B   -ok to stop abx      #Psoriatic arthritis   -on methotrexate weekly      #Normocytic anemia  -denies s/s of bleeding  -monitored CBC  -on folic acid   -iron studies, B12, folate pending --> WNLs      #Thyroid goiter   -patient reports this is chronic and aspiration was recommended to her but she